# Patient Record
Sex: FEMALE | Race: WHITE | NOT HISPANIC OR LATINO | Employment: FULL TIME | URBAN - METROPOLITAN AREA
[De-identification: names, ages, dates, MRNs, and addresses within clinical notes are randomized per-mention and may not be internally consistent; named-entity substitution may affect disease eponyms.]

---

## 2017-02-01 ENCOUNTER — OFFICE VISIT (OUTPATIENT)
Dept: FAMILY MEDICINE CLINIC | Facility: CLINIC | Age: 42
End: 2017-02-01

## 2017-02-01 VITALS
HEART RATE: 86 BPM | SYSTOLIC BLOOD PRESSURE: 122 MMHG | TEMPERATURE: 98.4 F | OXYGEN SATURATION: 99 % | HEIGHT: 65 IN | RESPIRATION RATE: 16 BRPM | BODY MASS INDEX: 36.49 KG/M2 | DIASTOLIC BLOOD PRESSURE: 60 MMHG | WEIGHT: 219 LBS

## 2017-02-01 DIAGNOSIS — R51.9 GENERALIZED HEADACHE: ICD-10-CM

## 2017-02-01 DIAGNOSIS — R42 DIZZINESS: Primary | ICD-10-CM

## 2017-02-01 DIAGNOSIS — R11.0 NAUSEA: ICD-10-CM

## 2017-02-01 PROCEDURE — 99214 OFFICE O/P EST MOD 30 MIN: CPT | Performed by: PHYSICIAN ASSISTANT

## 2017-02-01 RX ORDER — FLUTICASONE PROPIONATE 50 MCG
1 SPRAY, SUSPENSION (ML) NASAL
COMMUNITY
End: 2018-12-05

## 2017-02-01 NOTE — PROGRESS NOTES
Subjective   Chen Martin is a 41 y.o. female.   Chief Complaint   Patient presents with   • Dizziness     Vertigo       History of Present Illness     Chen is a 41-year-old female who presents with dizziness/vertigo for the past several years.  Chen has lost 6 pounds since February 26, 2016. States she gets dizzy and lightheaded.  Takes OTC dramamine daily.  She gets car sick if she is a passenger.  She is able to drive but still gets slightly dizzy.  Chen states when she turns her head she gets dizzy.  She gets nauseated daily. She gets an occasional headache,vomiting,head injury/trauma or vision changes.  Appetite has been normal.  Sleep has been normal. Bowel movements are daily without dark black tarry stools.  She has an IUD for birth control.  Chen was seen ENT specialist, Dr. Francis Carrion, last year.  She had a normal CAT scan of temporal bones.      The following portions of the patient's history were reviewed and updated as appropriate: allergies, current medications, past family history, past medical history, past social history and past surgical history.    Review of Systems   Constitutional: Negative.  Negative for chills, fatigue and fever.   HENT: Negative for congestion, ear pain, postnasal drip, rhinorrhea, sinus pressure and sore throat.    Eyes: Negative.    Respiratory: Negative.  Negative for cough, shortness of breath and wheezing.    Cardiovascular: Negative.  Negative for chest pain, palpitations and leg swelling.   Gastrointestinal: Negative.  Negative for constipation, diarrhea, nausea and vomiting.   Endocrine: Negative.    Genitourinary: Negative.    Musculoskeletal: Negative.    Skin: Negative.    Allergic/Immunologic: Negative.    Neurological: Positive for dizziness, light-headedness and headaches.   Hematological: Negative.    Psychiatric/Behavioral: Negative.    All other systems reviewed and are negative.    Vitals:    02/01/17 1031 02/01/17 1035 02/01/17 1037  "02/01/17 1038   BP: 114/74 112/64 118/76 122/60   BP Location: Right arm Left arm Right arm Left arm   Patient Position: Sitting Standing Sitting Lying   Cuff Size: Adult Adult Adult Adult   Pulse: 90 99 103 86   Resp: 16      Temp: 98.4 °F (36.9 °C)      TempSrc: Oral      SpO2: 99%      Weight: 219 lb (99.3 kg)      Height: 64.5\" (163.8 cm)        Wt Readings from Last 3 Encounters:   02/01/17 219 lb (99.3 kg)   02/26/16 225 lb 4.8 oz (102 kg)       BP Readings from Last 3 Encounters:   02/01/17 122/60   02/26/16 126/80     Body mass index is 37.01 kg/(m^2).    Allergies   Allergen Reactions   • Sulfa Antibiotics    • Vicodin [Hydrocodone-Acetaminophen]          Objective   Physical Exam   Constitutional: She is oriented to person, place, and time. Vital signs are normal. She appears well-developed and well-nourished.   HENT:   Head: Normocephalic and atraumatic.   Right Ear: Hearing, tympanic membrane, external ear and ear canal normal.   Left Ear: Hearing, tympanic membrane, external ear and ear canal normal.   Nose: Nose normal. Right sinus exhibits no maxillary sinus tenderness and no frontal sinus tenderness. Left sinus exhibits no maxillary sinus tenderness and no frontal sinus tenderness.   Mouth/Throat: Uvula is midline, oropharynx is clear and moist and mucous membranes are normal.   Eyes: Conjunctivae, EOM and lids are normal. Pupils are equal, round, and reactive to light.   Fundoscopic exam:       The right eye shows no papilledema.        The left eye shows no papilledema.   Neck: Trachea normal and phonation normal. Neck supple. Carotid bruit is not present. No edema present. No thyromegaly present.   Cardiovascular: Normal rate, regular rhythm, S1 normal, S2 normal, normal heart sounds, intact distal pulses and normal pulses.    Pulmonary/Chest: Effort normal and breath sounds normal.   Abdominal: Soft. Normal appearance, normal aorta and bowel sounds are normal. There is no hepatomegaly. There is " no tenderness.   Lymphadenopathy:     She has no cervical adenopathy.   Neurological: She is alert and oriented to person, place, and time. She has normal strength and normal reflexes. No cranial nerve deficit or sensory deficit. She displays a negative Romberg sign.   Reflex Scores:       Patellar reflexes are 2+ on the right side and 2+ on the left side.  Skin: Skin is warm, dry and intact.   Psychiatric: She has a normal mood and affect. Her speech is normal and behavior is normal. Judgment and thought content normal. Cognition and memory are normal.       Assessment/Plan   Chen was seen today for dizziness.    Diagnoses and all orders for this visit:    Dizziness  -     Vitamin B12; Future  -     Folate; Future  -     MRI Brain Without Contrast; Future  -     CBC & Differential; Future  -     Comprehensive Metabolic Panel; Future  -     Thyroid Panel With TSH; Future  -     Vitamin B12  -     Folate  -     CBC & Differential  -     Comprehensive Metabolic Panel  -     Thyroid Panel With TSH    Generalized headache  -     Vitamin B12; Future  -     Folate; Future  -     MRI Brain Without Contrast; Future  -     CBC & Differential; Future  -     Comprehensive Metabolic Panel; Future  -     Thyroid Panel With TSH; Future  -     Vitamin B12  -     Folate  -     CBC & Differential  -     Comprehensive Metabolic Panel  -     Thyroid Panel With TSH    Nausea  -     Vitamin B12; Future  -     Folate; Future  -     MRI Brain Without Contrast; Future  -     CBC & Differential; Future  -     Comprehensive Metabolic Panel; Future  -     Thyroid Panel With TSH; Future  -     Vitamin B12  -     Folate  -     CBC & Differential  -     Comprehensive Metabolic Panel  -     Thyroid Panel With TSH    Ms. Chen Martin was seen in office today for chronic dizziness,headaches and nausea for the past year or so.  I have reviewed her ENT office visit notes with her at today's office visit from last year.  I will schedule MRI of  brain for further evaluation.  She will have a vitamin D, B12/folate, CBC, CMP and  thyroid profile with TSH today today's office visit for further evaluation of her symptoms.  I'll consider possible referral to neurology if warranted.  I have asked Chen to start drinking 1 Gatorade a day to see if this will help improve some of her symptoms.  She may continue using her over-the-counter Dramamine as needed.      Dragon transcription disclaimer    Much of this encounter note is an electronic transcription/translation of spoken language to printed text.  The electronic translation of spoken language may permit erroneous, or at times, nonsensical words or phrases to be inadvertently transcribed.  Although I have reviewed the note for such errors, some may still exist.    Edyta Schmid PA-C  Family Practice

## 2017-02-02 ENCOUNTER — TELEPHONE (OUTPATIENT)
Dept: FAMILY MEDICINE CLINIC | Facility: CLINIC | Age: 42
End: 2017-02-02

## 2017-02-02 LAB
ALBUMIN SERPL-MCNC: 4.6 G/DL (ref 3.5–5.2)
ALBUMIN/GLOB SERPL: 2 G/DL
ALP SERPL-CCNC: 91 U/L (ref 40–129)
ALT SERPL-CCNC: 15 U/L (ref 5–33)
AST SERPL-CCNC: 15 U/L (ref 5–32)
BASOPHILS # BLD AUTO: 0.02 10*3/MM3 (ref 0–0.2)
BASOPHILS NFR BLD AUTO: 0.3 % (ref 0–2)
BILIRUB SERPL-MCNC: 0.6 MG/DL (ref 0.2–1.2)
BUN SERPL-MCNC: 9 MG/DL (ref 6–20)
BUN/CREAT SERPL: 12.3 (ref 7–25)
CALCIUM SERPL-MCNC: 9.5 MG/DL (ref 8.6–10.5)
CHLORIDE SERPL-SCNC: 101 MMOL/L (ref 98–107)
CO2 SERPL-SCNC: 26.3 MMOL/L (ref 22–29)
CREAT SERPL-MCNC: 0.73 MG/DL (ref 0.57–1)
EOSINOPHIL # BLD AUTO: 0.14 10*3/MM3 (ref 0.1–0.3)
EOSINOPHIL NFR BLD AUTO: 2.1 % (ref 0–4)
ERYTHROCYTE [DISTWIDTH] IN BLOOD BY AUTOMATED COUNT: 11.8 % (ref 11.5–14.5)
FOLATE SERPL-MCNC: 14.69 NG/ML (ref 4.78–24.2)
FT4I SERPL CALC-MCNC: 2 (ref 1.2–4.9)
GLOBULIN SER CALC-MCNC: 2.3 GM/DL
GLUCOSE SERPL-MCNC: 100 MG/DL (ref 65–99)
HCT VFR BLD AUTO: 41.6 % (ref 37–47)
HGB BLD-MCNC: 13.6 G/DL (ref 12–16)
IMM GRANULOCYTES # BLD: 0.01 10*3/MM3 (ref 0–0.03)
IMM GRANULOCYTES NFR BLD: 0.1 % (ref 0–0.5)
LYMPHOCYTES # BLD AUTO: 2.02 10*3/MM3 (ref 0.6–4.8)
LYMPHOCYTES NFR BLD AUTO: 29.9 % (ref 20–45)
MCH RBC QN AUTO: 31.5 PG (ref 27–31)
MCHC RBC AUTO-ENTMCNC: 32.7 G/DL (ref 31–37)
MCV RBC AUTO: 96.3 FL (ref 81–99)
MONOCYTES # BLD AUTO: 0.54 10*3/MM3 (ref 0–1)
MONOCYTES NFR BLD AUTO: 8 % (ref 3–8)
NEUTROPHILS # BLD AUTO: 4.03 10*3/MM3 (ref 1.5–8.3)
NEUTROPHILS NFR BLD AUTO: 59.6 % (ref 45–70)
NRBC BLD AUTO-RTO: 0 /100 WBC (ref 0–0)
PLATELET # BLD AUTO: 301 10*3/MM3 (ref 140–500)
POTASSIUM SERPL-SCNC: 4.9 MMOL/L (ref 3.5–5.2)
PROT SERPL-MCNC: 6.9 G/DL (ref 6–8.5)
RBC # BLD AUTO: 4.32 10*6/MM3 (ref 4.2–5.4)
SODIUM SERPL-SCNC: 142 MMOL/L (ref 136–145)
T3RU NFR SERPL: 28 % (ref 24–39)
T4 SERPL-MCNC: 7 UG/DL (ref 4.5–12)
TSH SERPL DL<=0.005 MIU/L-ACNC: 1.28 UIU/ML (ref 0.45–4.5)
VIT B12 SERPL-MCNC: 365 PG/ML (ref 211–946)
WBC # BLD AUTO: 6.76 10*3/MM3 (ref 4.8–10.8)

## 2017-02-02 NOTE — TELEPHONE ENCOUNTER
----- Message from Edyta Schmid PA-C sent at 2/2/2017  7:56 AM EST -----  1.  Please notify patient that her vitamin B-12 and folate were normal.  2.  CBC was also normal.  3.  Sugar was normal at 100.  She was non fasting.  4.  Thyroid testing was normal.

## 2017-03-10 ENCOUNTER — HOSPITAL ENCOUNTER (OUTPATIENT)
Dept: MRI IMAGING | Facility: HOSPITAL | Age: 42
Discharge: HOME | End: 2017-03-10

## 2017-03-10 ENCOUNTER — HOSPITAL ENCOUNTER (OUTPATIENT)
Dept: MRI IMAGING | Facility: HOSPITAL | Age: 42
End: 2017-03-10

## 2017-03-10 ENCOUNTER — HOSPITAL ENCOUNTER (OUTPATIENT)
Dept: MRI IMAGING | Facility: HOSPITAL | Age: 42
Discharge: HOME | End: 2017-03-10
Admitting: PHYSICIAN ASSISTANT

## 2017-03-10 DIAGNOSIS — R42 DIZZINESS: ICD-10-CM

## 2017-03-10 DIAGNOSIS — R11.0 NAUSEA: ICD-10-CM

## 2017-03-10 DIAGNOSIS — R51.9 GENERALIZED HEADACHE: ICD-10-CM

## 2017-03-10 PROCEDURE — 70551 MRI BRAIN STEM W/O DYE: CPT

## 2017-03-16 DIAGNOSIS — H69.82 DYSFUNCTION OF LEFT EUSTACHIAN TUBE: ICD-10-CM

## 2017-03-16 DIAGNOSIS — R51.9 GENERALIZED HEADACHE: ICD-10-CM

## 2017-03-16 DIAGNOSIS — R11.2 NON-INTRACTABLE VOMITING WITH NAUSEA, UNSPECIFIED VOMITING TYPE: ICD-10-CM

## 2017-03-16 DIAGNOSIS — R42 DIZZINESS: Primary | ICD-10-CM

## 2017-03-30 ENCOUNTER — HOSPITAL ENCOUNTER (OUTPATIENT)
Dept: MRI IMAGING | Facility: HOSPITAL | Age: 42
End: 2017-03-30

## 2017-04-11 ENCOUNTER — HOSPITAL ENCOUNTER (OUTPATIENT)
Dept: MRI IMAGING | Facility: HOSPITAL | Age: 42
Discharge: HOME | End: 2017-04-11
Admitting: PHYSICIAN ASSISTANT

## 2017-04-11 DIAGNOSIS — R11.2 NON-INTRACTABLE VOMITING WITH NAUSEA, UNSPECIFIED VOMITING TYPE: ICD-10-CM

## 2017-04-11 DIAGNOSIS — H69.82 DYSFUNCTION OF LEFT EUSTACHIAN TUBE: ICD-10-CM

## 2017-04-11 DIAGNOSIS — R42 DIZZINESS: ICD-10-CM

## 2017-04-11 DIAGNOSIS — R51.9 GENERALIZED HEADACHE: ICD-10-CM

## 2017-04-11 PROCEDURE — 0 GADOBENATE DIMEGLUMINE 529 MG/ML SOLUTION: Performed by: PHYSICIAN ASSISTANT

## 2017-04-11 PROCEDURE — 70553 MRI BRAIN STEM W/O & W/DYE: CPT

## 2017-04-11 PROCEDURE — A9577 INJ MULTIHANCE: HCPCS | Performed by: PHYSICIAN ASSISTANT

## 2017-04-11 RX ADMIN — GADOBENATE DIMEGLUMINE 19 ML: 529 INJECTION, SOLUTION INTRAVENOUS at 17:15

## 2017-04-14 DIAGNOSIS — R51.9 GENERALIZED HEADACHE: Primary | ICD-10-CM

## 2017-04-14 DIAGNOSIS — R42 DIZZINESS: ICD-10-CM

## 2017-06-12 ENCOUNTER — OFFICE VISIT (OUTPATIENT)
Dept: NEUROLOGY | Facility: CLINIC | Age: 42
End: 2017-06-12

## 2017-06-12 VITALS
SYSTOLIC BLOOD PRESSURE: 139 MMHG | BODY MASS INDEX: 37.32 KG/M2 | HEIGHT: 65 IN | DIASTOLIC BLOOD PRESSURE: 82 MMHG | WEIGHT: 224 LBS

## 2017-06-12 DIAGNOSIS — R42 DIZZINESS: Primary | ICD-10-CM

## 2017-06-12 DIAGNOSIS — R51.9 GENERALIZED HEADACHE: ICD-10-CM

## 2017-06-12 PROCEDURE — 99243 OFF/OP CNSLTJ NEW/EST LOW 30: CPT | Performed by: PSYCHIATRY & NEUROLOGY

## 2017-06-12 NOTE — PROGRESS NOTES
Subjective:     Patient ID: Chen Martin is a 41 y.o. female.    History of Present Illness   The patient is a 41-year-old right-handed woman who was seen for further evaluation of feeling dizzy and car sickness.  The patient was seen today in consultation per the request of Edyta Schmid.  Whenever she drives or turns her head quickly she will get vertigo or feeling like floating she's been seen by ENT but not prescribed any medicine.  She had an MRI the brain which showed some white matter changes only that were nonspecific she's also had an MRI of the auditory canals was normal.  She uses Dramamine when she drives.  She also gets headaches that occur only after she is dizzy.  She does not have headaches independent of the dizziness.  The following portions of the patient's history were reviewed and updated as appropriate: allergies, current medications, past family history, past medical history, past social history, past surgical history and problem list.    Family History   Problem Relation Age of Onset   • Osteoporosis Mother    • Diabetes Brother    • Heart disease Maternal Grandmother    • Hypertension Maternal Grandmother    • Glaucoma Maternal Grandmother      Active Ambulatory Problems     Diagnosis Date Noted   • Encounter for annual physical exam 02/26/2016   • Allergic rhinitis 02/26/2016   • Left knee pain 02/26/2016   • Dysfunction of left eustachian tube 02/26/2016   • Dizziness 02/01/2017   • Generalized headache 02/01/2017   • Nausea 02/01/2017     Resolved Ambulatory Problems     Diagnosis Date Noted   • No Resolved Ambulatory Problems     Past Medical History:   Diagnosis Date   • Migraine      Social History     Social History   • Marital status:      Spouse name: N/A   • Number of children: N/A   • Years of education: N/A     Occupational History   • Not on file.     Social History Main Topics   • Smoking status: Never Smoker   • Smokeless tobacco: Not on file   • Alcohol use Yes       Comment: SOCIALLY   • Drug use: No   • Sexual activity: Not on file     Other Topics Concern   • Not on file     Social History Narrative       Review of Systems   Constitutional: Negative.    Eyes: Negative.    Respiratory: Negative.    Cardiovascular: Negative.    Gastrointestinal: Negative.    Endocrine: Negative.    Musculoskeletal: Negative.    Skin: Negative.    Allergic/Immunologic: Negative.    Neurological: Positive for dizziness, light-headedness and headaches. Negative for tremors, seizures, syncope, facial asymmetry, speech difficulty, weakness and numbness.   Hematological: Negative.    Psychiatric/Behavioral: Negative.         Objective:    Neurologic Exam  Mental status examination showed normal orientation, memory, and speech.  Attention span and concentration were normal.  Fund of knowledge was normal.  Funduscopic showed no abnormality.  Visual fields were full.  Pupillary reflexes were 5 mm, symmetric, and equally reactive to light.  Eye movements were full and conjugate.  Gag reflex was normal.  Hearing was normal.  Muscles of mastication were normal.  No facial weakness was noted.  Shoulder shrug strength was normal bilaterally.  Tongue protrudes midline.  There is no drift of outstretched arms.  Deep tendon reflexes are 2+ and symmetric.  No focal weakness or atrophy was noted.  Tone was normal in all extremities.  No cerebellar signs were noted.  No abnormal movements were noted.  The patient's gait was normal.  No other pathologic reflexes such as Babinski's sign were noted.  No sensory abnormalities were noted.  Physical Exam    Assessment/Plan:     Chen was seen today for headache and dizziness.    Diagnoses and all orders for this visit:    Dizziness    Generalized headache       I do not feel that her problems are primarily neurological.  Her dizziness and car sickness are almost certainly related to peripheral vestibular dysfunction.  She has had neurological workup already.  She has  not been tried on any medication to suppress her presumed in her ear symptoms.    She also has headaches but they are only secondary to the attacks of dizziness.  This is not a primary headache problem.    As this is not a primary neurological problem I have not entered into her treatment medically.  However I feel that medications taken on a regular basis may help  her symptoms.  These include Antivert or a benzodiazepine.  The benzodiazepine that may help is Klonopin and at starting dose of 0.5 mg twice daily.    Plan to see her back in the office as needed.Thank you for allowing me to share in the care of this patient.  Adolph Minaya M.D.

## 2017-07-11 ENCOUNTER — APPOINTMENT (OUTPATIENT)
Dept: GENERAL RADIOLOGY | Facility: HOSPITAL | Age: 42
End: 2017-07-11

## 2017-07-11 PROCEDURE — 71020 XR CHEST 2 VW: CPT | Performed by: FAMILY MEDICINE

## 2017-07-11 PROCEDURE — 71020 HC CHEST PA AND LATERAL: CPT | Performed by: FAMILY MEDICINE

## 2018-03-30 ENCOUNTER — OFFICE VISIT (OUTPATIENT)
Dept: ORTHOPEDIC SURGERY | Facility: CLINIC | Age: 43
End: 2018-03-30

## 2018-03-30 VITALS — WEIGHT: 230 LBS | BODY MASS INDEX: 40.75 KG/M2 | HEIGHT: 63 IN

## 2018-03-30 DIAGNOSIS — S52.024A: ICD-10-CM

## 2018-03-30 DIAGNOSIS — R52 PAIN: Primary | ICD-10-CM

## 2018-03-30 PROCEDURE — 73080 X-RAY EXAM OF ELBOW: CPT | Performed by: NURSE PRACTITIONER

## 2018-03-30 PROCEDURE — 73110 X-RAY EXAM OF WRIST: CPT | Performed by: NURSE PRACTITIONER

## 2018-03-30 PROCEDURE — 99203 OFFICE O/P NEW LOW 30 MIN: CPT | Performed by: NURSE PRACTITIONER

## 2018-03-30 RX ORDER — ONDANSETRON 4 MG/1
4 TABLET, FILM COATED ORAL EVERY 8 HOURS PRN
Qty: 30 TABLET | Refills: 0 | Status: SHIPPED | OUTPATIENT
Start: 2018-03-30 | End: 2018-12-05

## 2018-03-30 RX ORDER — IBUPROFEN 600 MG/1
600 TABLET ORAL 3 TIMES DAILY
Qty: 90 TABLET | Refills: 0 | Status: SHIPPED | OUTPATIENT
Start: 2018-03-30 | End: 2018-12-04 | Stop reason: SDUPTHER

## 2018-03-30 RX ORDER — HYDROCODONE BITARTRATE AND ACETAMINOPHEN 5; 325 MG/1; MG/1
TABLET ORAL
Qty: 30 TABLET | Refills: 0 | Status: SHIPPED | OUTPATIENT
Start: 2018-03-30 | End: 2018-12-05

## 2018-03-30 NOTE — PROGRESS NOTES
"Subjective:     Patient ID: Chen Martin is a 42 y.o. female.    Chief Complaint: right elbow injury, 2018    History of Present Illness    Chen Martin presents with a 24 hour onset of pain at right elbow after falling while walking out of her home. Pain present at posterior aspect right elbow, throbbing in nature, increased pain noted with extension and flexion. Reports tingling sensation radiating down to hand and swelling present over elbow. Denies known previous injury in past. Has been taking ibuprofen as needed for symptom relief and applying ice. Denies all other concerns present at this time.      Social History     Occupational History   • Not on file.     Social History Main Topics   • Smoking status: Never Smoker   • Smokeless tobacco: Never Used   • Alcohol use Yes      Comment: SOCIALLY   • Drug use: No   • Sexual activity: Defer      Past Medical History:   Diagnosis Date   • Migraine      Past Surgical History:   Procedure Laterality Date   •  SECTION     • SINUS SURGERY         Family History   Problem Relation Age of Onset   • Osteoporosis Mother    • Diabetes Brother    • Heart disease Maternal Grandmother    • Hypertension Maternal Grandmother    • Glaucoma Maternal Grandmother          Review of Systems   Musculoskeletal: Positive for myalgias.           Objective:  Physical Exam    Vital signs reviewed.   General: No acute distress.  Eyes: conjunctiva clear; pupils equally round and reactive  ENT: external ears and nose atraumatic; oropharynx clear  CV: no peripheral edema  Resp: normal respiratory effort  Skin: no rashes or wounds; normal turgor  Psych: mood and affect appropriate; recent and remote memory intact    Vitals:    18 0836   Weight: 104 kg (230 lb)   Height: 160 cm (63\")     1    18  0836   Weight: 104 kg (230 lb)     Body mass index is 40.74 kg/m².     Right Elbow Exam     Tenderness   The patient is experiencing tenderness in the olecranon fossa. "     Range of Motion   Extension: 10   Flexion: 100   Pronation: 10   Supination: 100     Muscle Strength   Pronation:  4/5   Supination:  4/5     Tests Varus: negative  Valgus: negative  Tinel's Sign (cubital tunnel): negative    Other   Erythema: absent  Scars: absent  Sensation: normal  Pulse: present              Imaging:  Right Elbow X-Ray  Indication: Pain  Views: AP and Lateral views    Findings:  Nondisplaced fracture olecranon process  No bony lesion  Normal soft tissues  Normal joint spaces  No prior studies were available for comparison.    Right Wrist X-Ray  Indication: Pain  AP, Lateral, and Oblique views    Findings:  No fracture  No bony lesion  Normal soft tissues  Normal joint spaces    No prior studies were available for comparison.    Assessment:       1. Pain    2. Closed nondisplaced fracture of olecranon process of right ulna without intra-articular extension, initial encounter          Plan:  1. Discussed plan of care with patient. Provided with sling and will see her back in two weeks, x-rays to be completed at that time right elbow. Patient verbalized understanding of all information and agrees with plan of care. Denies all other concerns present at this time.     Orders:  Orders Placed This Encounter   Procedures   • XR elbow 3+ vw right   • XR Wrist 3+ View Right       I ordered and reviewed the EARLE today.     Dictated utilizing Dragon dictation

## 2018-04-13 ENCOUNTER — OFFICE VISIT (OUTPATIENT)
Dept: ORTHOPEDIC SURGERY | Facility: CLINIC | Age: 43
End: 2018-04-13

## 2018-04-13 VITALS — WEIGHT: 215 LBS | BODY MASS INDEX: 36.7 KG/M2 | HEIGHT: 64 IN

## 2018-04-13 DIAGNOSIS — S52.024D CLOSED NONDISPLACED FRACTURE OF OLECRANON PROCESS OF RIGHT ULNA WITHOUT INTRA-ARTICULAR EXTENSION WITH ROUTINE HEALING, SUBSEQUENT ENCOUNTER: ICD-10-CM

## 2018-04-13 DIAGNOSIS — R52 PAIN: Primary | ICD-10-CM

## 2018-04-13 PROCEDURE — 73080 X-RAY EXAM OF ELBOW: CPT | Performed by: NURSE PRACTITIONER

## 2018-04-13 PROCEDURE — 99024 POSTOP FOLLOW-UP VISIT: CPT | Performed by: NURSE PRACTITIONER

## 2018-04-13 NOTE — PROGRESS NOTES
CC: follow-up nondisplaced fracture right ulna, 03/29/2018    Interval history: Maryanne presents back to clinic today for follow-up right elbow. Continues to experience pain with extension and flexion activities. Denies presence of numbness and tingling radiating down right upper extremity. Denies all other concerns present at this time.     Exam:  ROM: -10 degrees - 90 degrees  Tenderness over lateral epicondyle   Positive sensation all aspects right upper extremity    Imaging:   Right Elbow X-Ray  Indication: Pain  Views: AP and Lateral views    Findings:  Nondisplaced fracture olecranon process right ulna  No bony lesion  Normal soft tissues  Normal joint spaces  Prior studies were available for comparison.    Assessment:   1. Closed, nondisplaced fracture olecranon process of right ulna, subsequent encounter    Plan:  1. Will plan to see her back in three weeks, repeat x-rays right elbow at that time. Patient verbalized understanding of all information and agrees with plan of care. Denies all other concerns present at this time.

## 2018-05-02 NOTE — TELEPHONE ENCOUNTER
rx was sent in this morning.      ----- Message from OBIE Gomez sent at 5/1/2018  4:22 PM EDT -----  Meme Nunez,  Can you please send in a prescription for Pennsaid for this patient? Thank you!! All is well and hope to go home tomorrow :)

## 2018-12-04 ENCOUNTER — OFFICE VISIT (OUTPATIENT)
Dept: ORTHOPEDIC SURGERY | Facility: CLINIC | Age: 43
End: 2018-12-04

## 2018-12-04 VITALS — WEIGHT: 200 LBS | HEIGHT: 64 IN | BODY MASS INDEX: 34.15 KG/M2

## 2018-12-04 DIAGNOSIS — R52 PAIN: Primary | ICD-10-CM

## 2018-12-04 DIAGNOSIS — M77.11 LATERAL EPICONDYLITIS OF RIGHT ELBOW: ICD-10-CM

## 2018-12-04 PROCEDURE — 99213 OFFICE O/P EST LOW 20 MIN: CPT | Performed by: NURSE PRACTITIONER

## 2018-12-04 PROCEDURE — 73080 X-RAY EXAM OF ELBOW: CPT | Performed by: NURSE PRACTITIONER

## 2018-12-04 PROCEDURE — 20605 DRAIN/INJ JOINT/BURSA W/O US: CPT | Performed by: NURSE PRACTITIONER

## 2018-12-04 RX ORDER — IBUPROFEN 600 MG/1
600 TABLET ORAL EVERY 8 HOURS PRN
Qty: 90 TABLET | Refills: 3 | Status: SHIPPED | OUTPATIENT
Start: 2018-12-04 | End: 2019-12-23

## 2018-12-04 RX ADMIN — LIDOCAINE HYDROCHLORIDE 2 ML: 10 INJECTION, SOLUTION EPIDURAL; INFILTRATION; INTRACAUDAL; PERINEURAL at 15:55

## 2018-12-04 RX ADMIN — BETAMETHASONE SODIUM PHOSPHATE AND BETAMETHASONE ACETATE 6 MG: 3; 3 INJECTION, SUSPENSION INTRA-ARTICULAR; INTRALESIONAL; INTRAMUSCULAR; SOFT TISSUE at 15:55

## 2018-12-04 NOTE — PROGRESS NOTES
Subjective:     Patient ID: Chen Martin is a 43 y.o. female.    Chief Complaint:  Right elbow pain  History of Present Illness  Chen Martin presents to clinic with a four-day worsening history of pain at the lateral aspect of the elbow.  Increased pain noted with repetitive motions and with lifting activities.  Most recently she was attempting to water [place] tree would've felt as if the elbow up her extended and began experiencing more significant pain.  She has had a fracture of the olecranon process approximately 7-8 months ago and states she has felt weaker since the injury.  Denies that she has been experiencing significant pain up until the last 4 days.  She has not tried bracing and has been taking 1-2 tablets of ibuprofen as needed.  She is right-hand dominant and therefore completing the majority of the activities with her right upper extremity.  Denies presence of numbness or tingling radiating down the right upper extremity.  Denies previous MRI or CT of the right elbow.  Denies all other concerns present this time.       Social History     Occupational History   • Not on file   Tobacco Use   • Smoking status: Never Smoker   • Smokeless tobacco: Never Used   Substance and Sexual Activity   • Alcohol use: Yes     Comment: SOCIALLY   • Drug use: No   • Sexual activity: Defer      Past Medical History:   Diagnosis Date   • Migraine      Past Surgical History:   Procedure Laterality Date   •  SECTION     • SINUS SURGERY         Family History   Problem Relation Age of Onset   • Osteoporosis Mother    • Diabetes Brother    • Heart disease Maternal Grandmother    • Hypertension Maternal Grandmother    • Glaucoma Maternal Grandmother          Review of Systems   Constitutional: Negative for chills, diaphoresis, fever and unexpected weight change.   HENT: Negative for hearing loss, nosebleeds, sore throat and tinnitus.    Eyes: Negative for pain and visual disturbance.   Respiratory: Negative  "for cough, shortness of breath and wheezing.    Cardiovascular: Negative for chest pain and palpitations.   Gastrointestinal: Negative for abdominal pain, diarrhea, nausea and vomiting.   Endocrine: Negative for cold intolerance, heat intolerance and polydipsia.   Genitourinary: Negative for difficulty urinating, dysuria and hematuria.   Musculoskeletal: Positive for arthralgias and myalgias. Negative for joint swelling.   Skin: Negative for rash and wound.   Allergic/Immunologic: Negative for environmental allergies.   Neurological: Negative for dizziness, syncope and numbness.   Hematological: Does not bruise/bleed easily.   Psychiatric/Behavioral: Negative for dysphoric mood and sleep disturbance. The patient is not nervous/anxious.            Objective:  Physical Exam    General: No acute distress.  Eyes: conjunctiva clear; pupils equally round and reactive  ENT: external ears and nose atraumatic; oropharynx clear  CV: no peripheral edema  Resp: normal respiratory effort  Skin: no rashes or wounds; normal turgor  Psych: mood and affect appropriate; recent and remote memory intact    Vitals:    12/04/18 1533   Weight: 90.7 kg (200 lb)   Height: 162.6 cm (64\")         12/04/18  1533   Weight: 90.7 kg (200 lb)     Body mass index is 34.33 kg/m².      Right Elbow Exam     Tenderness   The patient is experiencing tenderness in the lateral epicondyle.     Range of Motion   Extension: 0   Flexion: 110   Pronation: 0   Supination: 90     Muscle Strength   Pronation:  5/5   Supination:  5/5     Tests   Varus: negative  Valgus: negative  Tinel's sign (cubital tunnel): negative    Other   Erythema: absent  Scars: absent  Sensation: normal  Pulse: present               Imaging:  Right Elbow X-Ray  Indication: Pain  Views: AP and Lateral views    Findings:  No fracture  No bony lesion  Normal soft tissues  Normal joint spaces  Prior studies were available for comparison.    Assessment:        1. Pain    2. Lateral " epicondylitis of right elbow           Plan:  1.  Discussed plan of care with patient.  Wishes to proceed with corticosteroid injection over the lateral epicondyles the right upper extremity.  2.  We'll refill the ibuprofen 600 mg 1 tablet 3 times a day as needed for mild-to-moderate pain.  3.  Discussed with patient to apply topical moist heat as needed and complete strengthening exercises of the right upper extremity while in shower.  If not improving we'll plan to see her back in clinic.  Patient verbalized understanding of all information agrees plan of care.  Denies other concerns present this time.  Medium Joint Arthrocentesis: R elbow  Date/Time: 12/4/2018 3:55 PM  Consent given by: patient  Site marked: site marked  Timeout: Immediately prior to procedure a time out was called to verify the correct patient, procedure, equipment, support staff and site/side marked as required   Supporting Documentation  Indications: pain   Procedure Details  Location: elbow - R elbow  Preparation: Patient was prepped and draped in the usual sterile fashion  Needle size: 22 G  Approach: lateral.  Medications administered: 2 mL lidocaine PF 1% 1 %; 6 mg betamethasone acetate-betamethasone sodium phosphate 6 (3-3) MG/ML  Patient tolerance: patient tolerated the procedure well with no immediate complications          Orders:  Orders Placed This Encounter   Procedures   • Medium Joint Arthrocentesis: R elbow   • XR Elbow 3+ View Right       Dictated utilizing Dragon dictation

## 2018-12-05 PROBLEM — M77.11 LATERAL EPICONDYLITIS OF RIGHT ELBOW: Status: ACTIVE | Noted: 2018-12-05

## 2018-12-05 RX ORDER — BETAMETHASONE SODIUM PHOSPHATE AND BETAMETHASONE ACETATE 3; 3 MG/ML; MG/ML
6 INJECTION, SUSPENSION INTRA-ARTICULAR; INTRALESIONAL; INTRAMUSCULAR; SOFT TISSUE
Status: COMPLETED | OUTPATIENT
Start: 2018-12-04 | End: 2018-12-04

## 2018-12-05 RX ORDER — LIDOCAINE HYDROCHLORIDE 10 MG/ML
2 INJECTION, SOLUTION EPIDURAL; INFILTRATION; INTRACAUDAL; PERINEURAL
Status: COMPLETED | OUTPATIENT
Start: 2018-12-04 | End: 2018-12-04

## 2019-03-01 ENCOUNTER — OFFICE VISIT (OUTPATIENT)
Dept: ORTHOPEDIC SURGERY | Facility: CLINIC | Age: 44
End: 2019-03-01

## 2019-03-01 VITALS — WEIGHT: 200 LBS | BODY MASS INDEX: 33.32 KG/M2 | HEIGHT: 65 IN

## 2019-03-01 DIAGNOSIS — M77.11 LATERAL EPICONDYLITIS OF RIGHT ELBOW: ICD-10-CM

## 2019-03-01 DIAGNOSIS — R52 PAIN: Primary | ICD-10-CM

## 2019-03-01 PROCEDURE — 99212 OFFICE O/P EST SF 10 MIN: CPT | Performed by: NURSE PRACTITIONER

## 2019-03-01 PROCEDURE — 20605 DRAIN/INJ JOINT/BURSA W/O US: CPT | Performed by: NURSE PRACTITIONER

## 2019-03-01 RX ADMIN — LIDOCAINE HYDROCHLORIDE 2 ML: 20 INJECTION, SOLUTION EPIDURAL; INFILTRATION; INTRACAUDAL; PERINEURAL at 15:03

## 2019-03-01 RX ADMIN — TRIAMCINOLONE ACETONIDE 40 MG: 40 INJECTION, SUSPENSION INTRA-ARTICULAR; INTRAMUSCULAR at 15:03

## 2019-03-01 NOTE — PROGRESS NOTES
Subjective:     Patient ID: Chen Martin is a 43 y.o. female.    Chief Complaint:  Follow-up lateral epicondylitis, right   History of Present Illness  Chen Martin presents back to clinic for follow-up right elbow pain, lateral.  Increased pain with activities of daily living such as dressing, gripping with the right upper extremity and twisting motions.  She did receive 100% symptom relief with previous corticosteroid injection on 2018.  She has continued with application of topical anti-inflammatory and tennis elbow bracing however symptoms have returned over the last 2 weeks.  Maximal tenderness over the lateral aspect of the right elbow.  Discomfort rated at a 5-6 out of a 10 aching in nature.  Denies nonspecific injury within the last several weeks.  Denies presence of any bruising, numbness, tingling in all other concerns that she has at this time.       Social History     Occupational History   • Not on file   Tobacco Use   • Smoking status: Never Smoker   • Smokeless tobacco: Never Used   Substance and Sexual Activity   • Alcohol use: Yes     Comment: SOCIALLY   • Drug use: No   • Sexual activity: Defer      Past Medical History:   Diagnosis Date   • Migraine      Past Surgical History:   Procedure Laterality Date   •  SECTION     • SINUS SURGERY         Family History   Problem Relation Age of Onset   • Osteoporosis Mother    • Diabetes Brother    • Heart disease Maternal Grandmother    • Hypertension Maternal Grandmother    • Glaucoma Maternal Grandmother          Review of Systems   Constitutional: Negative for chills, diaphoresis, fever and unexpected weight change.   HENT: Negative for hearing loss, nosebleeds, sore throat and tinnitus.    Eyes: Negative for pain and visual disturbance.   Respiratory: Negative for cough, shortness of breath and wheezing.    Cardiovascular: Negative for chest pain and palpitations.   Gastrointestinal: Negative for abdominal pain, diarrhea, nausea and  "vomiting.   Endocrine: Negative for cold intolerance, heat intolerance and polydipsia.   Genitourinary: Negative for difficulty urinating, dysuria and hematuria.   Musculoskeletal: Positive for myalgias. Negative for arthralgias and joint swelling.   Skin: Negative for rash and wound.   Allergic/Immunologic: Negative for environmental allergies.   Neurological: Negative for dizziness, syncope and numbness.   Hematological: Does not bruise/bleed easily.   Psychiatric/Behavioral: Negative for dysphoric mood and sleep disturbance. The patient is not nervous/anxious.            Objective:  Physical Exam    General: No acute distress.  Eyes: conjunctiva clear; pupils equally round and reactive  ENT: external ears and nose atraumatic; oropharynx clear  CV: no peripheral edema  Resp: normal respiratory effort  Skin: no rashes or wounds; normal turgor  Psych: mood and affect appropriate; recent and remote memory intact    Vitals:    03/01/19 1454   Weight: 90.7 kg (200 lb)   Height: 165.1 cm (65\")         03/01/19  1454   Weight: 90.7 kg (200 lb)     Body mass index is 33.28 kg/m².      Right Elbow Exam     Tenderness   The patient is experiencing tenderness in the lateral epicondyle.     Range of Motion   Extension: 0   Flexion: 100   Pronation: 0   Supination: 90     Muscle Strength   Pronation:  4/5   Supination:  4/5     Tests   Varus: negative  Valgus: negative  Tinel's sign (cubital tunnel): negative    Other   Erythema: absent  Sensation: normal  Pulse: present    Comments:  Positive pain with resisted wrist extension   Positive pain with resisted extension of third digit  4+/5  strength            Assessment:        1. Pain    2. Lateral epicondylitis of right elbow           Plan: 1.  Discussed plan of care with patient.  Wishes to proceed with corticosteroid injection over the lateral epicondyles of the right elbow.  Encouraged her to continue with application of  Topical anti-inflammatory as well as oral " anti-inflammatory.  We did briefly discussed occupational therapy as an option as well as platelet rich plasma injection.  She is not ready to proceed with the platelet rich plasma and treatment at this time.  Patient verbalized understanding of all of my sugars with plan of care.  Denies all other concerns present at this time.    Medium Joint Arthrocentesis: R elbow  Date/Time: 3/1/2019 3:03 PM  Consent given by: patient  Site marked: site marked  Timeout: Immediately prior to procedure a time out was called to verify the correct patient, procedure, equipment, support staff and site/side marked as required   Supporting Documentation  Indications: pain   Procedure Details  Location: elbow - R elbow  Preparation: Patient was prepped and draped in the usual sterile fashion  Needle size: 22 G  Approach: anteromedial  Medications administered: 2 mL lidocaine PF 2% 2 %; 40 mg triamcinolone acetonide 40 MG/ML  Patient tolerance: patient tolerated the procedure well with no immediate complications            Orders:  Orders Placed This Encounter   Procedures   • Medium Joint Arthrocentesis: R elbow       I ordered and reviewed the EARLE today.     Dictated utilizing Dragon dictation

## 2019-03-03 RX ORDER — LIDOCAINE HYDROCHLORIDE 20 MG/ML
2 INJECTION, SOLUTION EPIDURAL; INFILTRATION; INTRACAUDAL; PERINEURAL
Status: COMPLETED | OUTPATIENT
Start: 2019-03-01 | End: 2019-03-01

## 2019-03-03 RX ORDER — TRIAMCINOLONE ACETONIDE 40 MG/ML
40 INJECTION, SUSPENSION INTRA-ARTICULAR; INTRAMUSCULAR
Status: COMPLETED | OUTPATIENT
Start: 2019-03-01 | End: 2019-03-01

## 2019-06-06 DIAGNOSIS — Z00.00 ENCOUNTER FOR ANNUAL PHYSICAL EXAM: Primary | ICD-10-CM

## 2019-06-08 LAB
ALBUMIN SERPL-MCNC: 4.4 G/DL (ref 3.5–5.2)
ALBUMIN/GLOB SERPL: 1.8 G/DL
ALP SERPL-CCNC: 89 U/L (ref 39–117)
ALT SERPL-CCNC: 21 U/L (ref 1–33)
AST SERPL-CCNC: 14 U/L (ref 1–32)
BASOPHILS # BLD AUTO: 0.03 10*3/MM3 (ref 0–0.2)
BASOPHILS NFR BLD AUTO: 0.5 % (ref 0–1.5)
BILIRUB SERPL-MCNC: 0.5 MG/DL (ref 0.2–1.2)
BUN SERPL-MCNC: 8 MG/DL (ref 6–20)
BUN/CREAT SERPL: 12.3 (ref 7–25)
CALCIUM SERPL-MCNC: 9.7 MG/DL (ref 8.6–10.5)
CHLORIDE SERPL-SCNC: 103 MMOL/L (ref 98–107)
CO2 SERPL-SCNC: 27.3 MMOL/L (ref 22–29)
CREAT SERPL-MCNC: 0.65 MG/DL (ref 0.57–1)
EOSINOPHIL # BLD AUTO: 0.2 10*3/MM3 (ref 0–0.4)
EOSINOPHIL NFR BLD AUTO: 3.1 % (ref 0.3–6.2)
ERYTHROCYTE [DISTWIDTH] IN BLOOD BY AUTOMATED COUNT: 12.5 % (ref 12.3–15.4)
FOLATE SERPL-MCNC: 4.91 NG/ML (ref 4.78–24.2)
GLOBULIN SER CALC-MCNC: 2.4 GM/DL
GLUCOSE SERPL-MCNC: 90 MG/DL (ref 65–99)
HCT VFR BLD AUTO: 43.7 % (ref 34–46.6)
HGB BLD-MCNC: 13.6 G/DL (ref 12–15.9)
IMM GRANULOCYTES # BLD AUTO: 0.02 10*3/MM3 (ref 0–0.05)
IMM GRANULOCYTES NFR BLD AUTO: 0.3 % (ref 0–0.5)
LYMPHOCYTES # BLD AUTO: 1.68 10*3/MM3 (ref 0.7–3.1)
LYMPHOCYTES NFR BLD AUTO: 25.6 % (ref 19.6–45.3)
MCH RBC QN AUTO: 31.8 PG (ref 26.6–33)
MCHC RBC AUTO-ENTMCNC: 31.1 G/DL (ref 31.5–35.7)
MCV RBC AUTO: 102.1 FL (ref 79–97)
MONOCYTES # BLD AUTO: 0.62 10*3/MM3 (ref 0.1–0.9)
MONOCYTES NFR BLD AUTO: 9.5 % (ref 5–12)
NEUTROPHILS # BLD AUTO: 4 10*3/MM3 (ref 1.7–7)
NEUTROPHILS NFR BLD AUTO: 61 % (ref 42.7–76)
NRBC BLD AUTO-RTO: 0 /100 WBC (ref 0–0.2)
PLATELET # BLD AUTO: 267 10*3/MM3 (ref 140–450)
POTASSIUM SERPL-SCNC: 4.5 MMOL/L (ref 3.5–5.2)
PROT SERPL-MCNC: 6.8 G/DL (ref 6–8.5)
RBC # BLD AUTO: 4.28 10*6/MM3 (ref 3.77–5.28)
SODIUM SERPL-SCNC: 140 MMOL/L (ref 136–145)
TSH SERPL DL<=0.005 MIU/L-ACNC: 1.17 MIU/ML (ref 0.27–4.2)
VIT B12 SERPL-MCNC: 344 PG/ML (ref 211–946)
WBC # BLD AUTO: 6.55 10*3/MM3 (ref 3.4–10.8)

## 2019-06-10 LAB
CHOLEST SERPL-MCNC: 192 MG/DL (ref 0–200)
HDLC SERPL-MCNC: 47 MG/DL (ref 40–60)
LDLC SERPL CALC-MCNC: 121 MG/DL (ref 0–100)
TRIGL SERPL-MCNC: 122 MG/DL (ref 0–150)
VLDLC SERPL CALC-MCNC: 24.4 MG/DL (ref 5–40)
WRITTEN AUTHORIZATION: NORMAL

## 2019-06-14 ENCOUNTER — OFFICE VISIT (OUTPATIENT)
Dept: FAMILY MEDICINE CLINIC | Facility: CLINIC | Age: 44
End: 2019-06-14

## 2019-06-14 VITALS
WEIGHT: 240 LBS | BODY MASS INDEX: 39.99 KG/M2 | DIASTOLIC BLOOD PRESSURE: 78 MMHG | HEIGHT: 65 IN | SYSTOLIC BLOOD PRESSURE: 112 MMHG | TEMPERATURE: 98.2 F | OXYGEN SATURATION: 98 % | HEART RATE: 100 BPM | RESPIRATION RATE: 16 BRPM

## 2019-06-14 DIAGNOSIS — Z23 NEED FOR TETANUS, DIPHTHERIA, AND ACELLULAR PERTUSSIS (TDAP) VACCINE IN PATIENT OF ADOLESCENT AGE OR OLDER: ICD-10-CM

## 2019-06-14 DIAGNOSIS — E66.9 OBESITY (BMI 30-39.9): ICD-10-CM

## 2019-06-14 DIAGNOSIS — Z00.00 ENCOUNTER FOR ANNUAL PHYSICAL EXAM: Primary | ICD-10-CM

## 2019-06-14 PROBLEM — R11.0 NAUSEA: Status: RESOLVED | Noted: 2017-02-01 | Resolved: 2019-06-14

## 2019-06-14 PROCEDURE — 90471 IMMUNIZATION ADMIN: CPT | Performed by: PHYSICIAN ASSISTANT

## 2019-06-14 PROCEDURE — 99396 PREV VISIT EST AGE 40-64: CPT | Performed by: PHYSICIAN ASSISTANT

## 2019-06-14 PROCEDURE — 90715 TDAP VACCINE 7 YRS/> IM: CPT | Performed by: PHYSICIAN ASSISTANT

## 2019-06-14 NOTE — PROGRESS NOTES
"Subjective   Chen Martin is a 43 y.o. female presents for   Chief Complaint   Patient presents with   • Annual Exam       History of Present Illness   Chen is a 43-year-old female who presents for annual physical examination.  She has gained 40 pounds since March 1, 2019.  Elina states she is feeling well at office visit today.  She sees Dr. Lucia Cruz for her Pap smears.  Bowel movements have been daily without dark black tarry stools.  She has been making healthy choices with eating.  She is trying to lose weight by exercising and decreasing portion sizes.  Sleep has been normal.  Denied any upper respiratory symptoms, chest pain, shortness of air, dizziness, vision changes, abdominal pain, urinary symptoms, or swelling of ankles.  She has no complaints at office visit today.    The following portions of the patient's history were reviewed and updated as appropriate: allergies, current medications, past family history, past medical history, past social history, past surgical history and problem list.    Review of Systems   Constitutional: Negative.    HENT: Negative.    Eyes: Negative.    Respiratory: Negative.  Negative for cough, chest tightness, shortness of breath and wheezing.    Cardiovascular: Negative.  Negative for chest pain, palpitations and leg swelling.   Gastrointestinal: Negative.  Negative for abdominal pain, anal bleeding, blood in stool, constipation, diarrhea, nausea, rectal pain and vomiting.   Endocrine: Negative.    Genitourinary: Negative.    Musculoskeletal: Negative.    Skin: Negative.    Allergic/Immunologic: Negative.    Neurological: Negative.    Hematological: Negative.    Psychiatric/Behavioral: Negative.  Negative for sleep disturbance and suicidal ideas.   All other systems reviewed and are negative.        Vitals:    06/14/19 0931   BP: 112/78   Pulse: 100   Resp: 16   Temp: 98.2 °F (36.8 °C)   SpO2: 98%   Weight: 109 kg (240 lb)   Height: 165.1 cm (65\")     Wt Readings " from Last 3 Encounters:   06/14/19 109 kg (240 lb)   03/01/19 90.7 kg (200 lb)   12/04/18 90.7 kg (200 lb)     BP Readings from Last 3 Encounters:   06/14/19 112/78   07/11/17 133/85   06/12/17 139/82     Social History     Socioeconomic History   • Marital status:      Spouse name: Not on file   • Number of children: Not on file   • Years of education: Not on file   • Highest education level: Not on file   Tobacco Use   • Smoking status: Never Smoker   • Smokeless tobacco: Never Used   Substance and Sexual Activity   • Alcohol use: Yes     Comment: SOCIALLY   • Drug use: No   • Sexual activity: Defer       Allergies   Allergen Reactions   • Sulfa Antibiotics    • Vicodin [Hydrocodone-Acetaminophen]        Body mass index is 39.94 kg/m².    Objective   Physical Exam   Constitutional: She is oriented to person, place, and time. Vital signs are normal. She appears well-developed and well-nourished.   Obese female   HENT:   Head: Normocephalic and atraumatic.   Right Ear: Hearing, tympanic membrane, external ear and ear canal normal.   Left Ear: Hearing, tympanic membrane, external ear and ear canal normal.   Nose: Nose normal. Right sinus exhibits no maxillary sinus tenderness and no frontal sinus tenderness. Left sinus exhibits no maxillary sinus tenderness and no frontal sinus tenderness.   Mouth/Throat: Uvula is midline, oropharynx is clear and moist and mucous membranes are normal.   Eyes: Conjunctivae, EOM and lids are normal. Pupils are equal, round, and reactive to light.   Neck: Trachea normal and phonation normal. Neck supple. No edema present. No thyroid mass and no thyromegaly present.   Cardiovascular: Normal rate, regular rhythm, S1 normal, S2 normal, normal heart sounds and normal pulses.   Pulmonary/Chest: Effort normal and breath sounds normal.   Abdominal: Soft. Normal appearance, normal aorta and bowel sounds are normal. There is no hepatomegaly. There is no tenderness.   Lymphadenopathy:      She has no cervical adenopathy.   Neurological: She is alert and oriented to person, place, and time.   Reflex Scores:       Patellar reflexes are 2+ on the right side and 2+ on the left side.  Skin: Skin is warm, dry and intact. Capillary refill takes less than 2 seconds.   Psychiatric: She has a normal mood and affect. Her speech is normal and behavior is normal. Judgment and thought content normal. Cognition and memory are normal.       Assessment/Plan   Chen was seen today for annual exam.    Diagnoses and all orders for this visit:    Encounter for annual physical exam    Need for tetanus, diphtheria, and acellular pertussis (Tdap) vaccine in patient of adolescent age or older  -     Tdap Vaccine Greater Than or Equal To 6yo IM    Obesity (BMI 30-39.9)     1.  Annual physical examination.  I have reviewed her lab work from June 7, 2019 with her at office visit today.  Cholesterol 192, triglycerides 122, HDL 47, , TSH 1.17, and fasting blood sugar was 90.  She was encouraged to decrease her carbohydrates and fatty food intake.  She will also increase her physical activity.  She was given encouragement for weight loss.  Plan to follow-up in 1 year.  2.  Need for Tdap vaccination: She is given written consent to receive updated Tdap vaccination.  3.  Chronic obesity: We have discussed about joining weight watchers and making healthy choices to help her lose weight.  Chen was encouraged to increase her physical activity and to eat healthy.  Will reevaluate weight at next office visit.  She voiced understanding.      KENDELL [facility]  [address] [place] [id number]  Dept: [phone]  Dept Fax: [phone]  Loc: [phone]  Loc Fax: [phone]        Orders Only on 06/06/2019   Component Date Value Ref Range Status   • TSH 06/07/2019 1.170  0.270 - 4.200 mIU/mL Final   • Glucose 06/07/2019 90  65 - 99 mg/dL  Final   • BUN 06/07/2019 8  6 - 20 mg/dL Final   • Creatinine 06/07/2019 0.65  0.57 - 1.00 mg/dL Final   • eGFR Non African Am 06/07/2019 100  >60 mL/min/1.73 Final   • eGFR African Am 06/07/2019 121  >60 mL/min/1.73 Final   • BUN/Creatinine Ratio 06/07/2019 12.3  7.0 - 25.0 Final   • Sodium 06/07/2019 140  136 - 145 mmol/L Final   • Potassium 06/07/2019 4.5  3.5 - 5.2 mmol/L Final   • Chloride 06/07/2019 103  98 - 107 mmol/L Final   • Total CO2 06/07/2019 27.3  22.0 - 29.0 mmol/L Final   • Calcium 06/07/2019 9.7  8.6 - 10.5 mg/dL Final   • Total Protein 06/07/2019 6.8  6.0 - 8.5 g/dL Final   • Albumin 06/07/2019 4.40  3.50 - 5.20 g/dL Final   • Globulin 06/07/2019 2.4  gm/dL Final   • A/G Ratio 06/07/2019 1.8  g/dL Final   • Total Bilirubin 06/07/2019 0.5  0.2 - 1.2 mg/dL Final   • Alkaline Phosphatase 06/07/2019 89  39 - 117 U/L Final   • AST (SGOT) 06/07/2019 14  1 - 32 U/L Final   • ALT (SGPT) 06/07/2019 21  1 - 33 U/L Final   • Folate 06/07/2019 4.91  4.78 - 24.20 ng/mL Final   • Vitamin B-12 06/07/2019 344  211 - 946 pg/mL Final   • WBC 06/07/2019 6.55  3.40 - 10.80 10*3/mm3 Final   • RBC 06/07/2019 4.28  3.77 - 5.28 10*6/mm3 Final   • Hemoglobin 06/07/2019 13.6  12.0 - 15.9 g/dL Final   • Hematocrit 06/07/2019 43.7  34.0 - 46.6 % Final   • MCV 06/07/2019 102.1* 79.0 - 97.0 fL Final   • MCH 06/07/2019 31.8  26.6 - 33.0 pg Final   • MCHC 06/07/2019 31.1* 31.5 - 35.7 g/dL Final   • RDW 06/07/2019 12.5  12.3 - 15.4 % Final   • Platelets 06/07/2019 267  140 - 450 10*3/mm3 Final   • Neutrophil Rel % 06/07/2019 61.0  42.7 - 76.0 % Final   • Lymphocyte Rel % 06/07/2019 25.6  19.6 - 45.3 % Final   • Monocyte Rel % 06/07/2019 9.5  5.0 - 12.0 % Final   • Eosinophil Rel % 06/07/2019 3.1  0.3 - 6.2 % Final   • Basophil Rel % 06/07/2019 0.5  0.0 - 1.5 % Final   • Neutrophils Absolute 06/07/2019 4.00  1.70 - 7.00 10*3/mm3 Final   • Lymphocytes Absolute 06/07/2019 1.68  0.70 - 3.10 10*3/mm3 Final   • Monocytes Absolute  06/07/2019 0.62  0.10 - 0.90 10*3/mm3 Final   • Eosinophils Absolute 06/07/2019 0.20  0.00 - 0.40 10*3/mm3 Final   • Basophils Absolute 06/07/2019 0.03  0.00 - 0.20 10*3/mm3 Final   • Immature Granulocyte Rel % 06/07/2019 0.3  0.0 - 0.5 % Final   • Immature Grans Absolute 06/07/2019 0.02  0.00 - 0.05 10*3/mm3 Final   • nRBC 06/07/2019 0.0  0.0 - 0.2 /100 WBC Final   • Total Cholesterol 06/07/2019 192  0 - 200 mg/dL Final   • Triglycerides 06/07/2019 122  0 - 150 mg/dL Final   • HDL Cholesterol 06/07/2019 47  40 - 60 mg/dL Final   • VLDL Cholesterol 06/07/2019 24.4  5 - 40 mg/dL Final   • LDL Cholesterol  06/07/2019 121* 0 - 100 mg/dL Final   • Written Authorization 06/07/2019 Comment   Final    Comment: Written Authorization Received.  Authorization received from DONG CANO MA 06-  Logged by Gayle Alba

## 2019-11-18 ENCOUNTER — HOSPITAL ENCOUNTER (OUTPATIENT)
Dept: GENERAL RADIOLOGY | Facility: HOSPITAL | Age: 44
Discharge: HOME | End: 2019-11-18
Admitting: NURSE PRACTITIONER

## 2019-11-18 ENCOUNTER — OFFICE VISIT (OUTPATIENT)
Dept: FAMILY MEDICINE CLINIC | Facility: CLINIC | Age: 44
End: 2019-11-18

## 2019-11-18 VITALS
OXYGEN SATURATION: 98 % | TEMPERATURE: 99.8 F | HEART RATE: 96 BPM | HEIGHT: 65 IN | WEIGHT: 231 LBS | DIASTOLIC BLOOD PRESSURE: 80 MMHG | BODY MASS INDEX: 38.49 KG/M2 | SYSTOLIC BLOOD PRESSURE: 122 MMHG | RESPIRATION RATE: 16 BRPM

## 2019-11-18 DIAGNOSIS — R10.9 FLANK PAIN: ICD-10-CM

## 2019-11-18 DIAGNOSIS — R11.0 NAUSEA: ICD-10-CM

## 2019-11-18 DIAGNOSIS — R10.11 RIGHT UPPER QUADRANT ABDOMINAL PAIN: Primary | ICD-10-CM

## 2019-11-18 LAB
BILIRUB BLD-MCNC: NEGATIVE MG/DL
CLARITY, POC: ABNORMAL
COLOR UR: ABNORMAL
GLUCOSE UR STRIP-MCNC: NEGATIVE MG/DL
KETONES UR QL: ABNORMAL
LEUKOCYTE EST, POC: NEGATIVE
NITRITE UR-MCNC: NEGATIVE MG/ML
PH UR: 6.5 [PH] (ref 5–8)
PROT UR STRIP-MCNC: NEGATIVE MG/DL
RBC # UR STRIP: ABNORMAL /UL
SP GR UR: 1.01 (ref 1–1.03)
UROBILINOGEN UR QL: NORMAL

## 2019-11-18 PROCEDURE — 74018 RADEX ABDOMEN 1 VIEW: CPT

## 2019-11-18 PROCEDURE — 99213 OFFICE O/P EST LOW 20 MIN: CPT | Performed by: NURSE PRACTITIONER

## 2019-11-18 PROCEDURE — 96372 THER/PROPH/DIAG INJ SC/IM: CPT | Performed by: NURSE PRACTITIONER

## 2019-11-18 PROCEDURE — 81002 URINALYSIS NONAUTO W/O SCOPE: CPT | Performed by: NURSE PRACTITIONER

## 2019-11-18 RX ORDER — KETOROLAC TROMETHAMINE 30 MG/ML
30 INJECTION, SOLUTION INTRAMUSCULAR; INTRAVENOUS ONCE
Status: COMPLETED | OUTPATIENT
Start: 2019-11-18 | End: 2019-11-18

## 2019-11-18 RX ORDER — ONDANSETRON 4 MG/1
4 TABLET, FILM COATED ORAL EVERY 8 HOURS PRN
Qty: 20 TABLET | Refills: 0 | Status: SHIPPED | OUTPATIENT
Start: 2019-11-18 | End: 2021-01-06

## 2019-11-18 RX ADMIN — KETOROLAC TROMETHAMINE 30 MG: 30 INJECTION, SOLUTION INTRAMUSCULAR; INTRAVENOUS at 16:13

## 2019-11-18 NOTE — PROGRESS NOTES
Patient ID: Chen Martin is a 44 y.o. female     Subjective     Chief Complaint   Patient presents with   • Abdominal Pain     X 2 days       History of Present Illness    Chen Martin presents to the office today for right sided abdominal pain with radiation to right flank.  Onset of symptoms was Saturday.  She had traveled out of state for a conference.  Along with abdominal pain she is have fatigue and weakness along with nausea.  She checked her temp earlier this morning with a temporal thermometer and states that it was 101.  She has taken ibuprofen with no improvement of the abdominal pain.  About a year ago, she ended up in the emergency room due to similar symptoms and was found to have gallstone with gallbladder sludge.  Her symptoms improved after couple of days and was not sent for further evaluation concerning gallbladder.  Has not had any other episodes since then.  Denies any hematuria,dysuria, constipation, or diarrhea.    She denies any complaints of chills, cough, chest pain, shortness of air, or any other concerns.     The following portions of the patient's history were reviewed and updated as appropriate: allergies, current medications, past family history, past medical history, past social history, past surgical history and problem list.       Review of Systems   Constitution: Positive for fever (101), weakness and malaise/fatigue.   HENT: Negative.    Eyes: Negative.    Cardiovascular: Negative.    Respiratory: Negative.    Endocrine: Negative.    Hematologic/Lymphatic: Negative.    Skin: Negative.    Gastrointestinal: Positive for abdominal pain and nausea. Negative for vomiting.   Genitourinary: Positive for flank pain. Negative for dysuria, frequency and urgency.   Psychiatric/Behavioral: Negative.    All other systems reviewed and are negative.      Vitals:    11/18/19 1541   BP: 122/80   Pulse: 96   Resp: 16   Temp: 99.8 °F (37.7 °C)   SpO2: 98%       Documented weights    11/18/19  1541   Weight: 105 kg (231 lb)     Body mass index is 38.44 kg/m².    Results for orders placed or performed in visit on 11/18/19   POCT urinalysis dipstick, manual   Result Value Ref Range    Color Dark Yellow Yellow, Straw, Dark Yellow, [place]    Clarity, UA Hazy (A) Clear    Glucose, UA Negative Negative, 1000 mg/dL (3+) mg/dL    Bilirubin Negative Negative    Ketones, UA 2+ (A) Negative    Specific Gravity  1.010 1.005 - 1.030    Blood, UA Trace (A) Negative    pH, Urine 6.5 5.0 - 8.0    Protein, POC Negative Negative mg/dL    Urobilinogen, UA Normal Normal    Leukocytes Negative Negative    Nitrite, UA Negative Negative       Objective     Physical Exam   Constitutional: She is oriented to person, place, and time. No distress.   HENT:   Head: Normocephalic and atraumatic.   Neck: Normal range of motion. No JVD present.   Cardiovascular: Normal rate and regular rhythm.   No murmur heard.  Pulmonary/Chest: Effort normal and breath sounds normal. She has no wheezes.   Abdominal: Soft. Bowel sounds are normal. She exhibits no distension. There is no hepatosplenomegaly. There is tenderness in the right upper quadrant. There is CVA tenderness (right).   Musculoskeletal: Normal range of motion. She exhibits no edema.   Neurological: She is alert and oriented to person, place, and time.   Skin: Skin is warm and dry. No rash noted.   Psychiatric: She has a normal mood and affect.       Assessment/Plan     Assessment/Plan     Chen was seen today for abdominal pain.    Diagnoses and all orders for this visit:    Right upper quadrant abdominal pain  -     CBC & Differential  -     Comprehensive Metabolic Panel  -     ketorolac (TORADOL) injection 30 mg  -     XR abdomen 1 vw  -     US Gallbladder; Future  -     ondansetron (ZOFRAN) 4 MG tablet; Take 1 tablet by mouth Every 8 (Eight) Hours As Needed for Nausea or Vomiting.    Flank pain  -     POCT urinalysis dipstick, manual  -     XR abdomen 1 vw    Nausea  -      ondansetron (ZOFRAN) 4 MG tablet; Take 1 tablet by mouth Every 8 (Eight) Hours As Needed for Nausea or Vomiting.      Summary:  Chen Martin presents to office today with right upper abdominal pain.  Suspect gallbladder.  Toradol 30 mg IM today for pain.  Will check CBC and CMP and call results.  Will check x-ray of abdomen today and will call with results.  Will order for gallbladder ultrasound, hopefully will get completed tomorrow.  Will call with results once done.  In the meantime instructed to avoid any fried fatty spicy foods.  Clear liquid diet advance as tolerated.  Zofran as needed for nausea.  Instructed if her symptoms worsen throughout the night, to go directly to the emergency room for evaluation.  Patient verbalized understanding.    In the meantime, instructed to contact us sooner for any problems or concerns.    Mercedes Workman, APRN  Family Medicine  [facility]  11/18/19  5:09 PM

## 2019-11-19 LAB
ALBUMIN SERPL-MCNC: 4.6 G/DL (ref 3.5–5.5)
ALBUMIN/GLOB SERPL: 2.2 {RATIO} (ref 1.2–2.2)
ALP SERPL-CCNC: 102 IU/L (ref 39–117)
ALT SERPL-CCNC: 23 IU/L (ref 0–32)
AST SERPL-CCNC: 19 IU/L (ref 0–40)
BASOPHILS # BLD AUTO: 0 X10E3/UL (ref 0–0.2)
BASOPHILS NFR BLD AUTO: 0 %
BILIRUB SERPL-MCNC: 0.6 MG/DL (ref 0–1.2)
BUN SERPL-MCNC: 6 MG/DL (ref 6–24)
BUN/CREAT SERPL: 10 (ref 9–23)
CALCIUM SERPL-MCNC: 8.9 MG/DL (ref 8.7–10.2)
CHLORIDE SERPL-SCNC: 103 MMOL/L (ref 96–106)
CO2 SERPL-SCNC: 20 MMOL/L (ref 20–29)
CREAT SERPL-MCNC: 0.63 MG/DL (ref 0.57–1)
EOSINOPHIL # BLD AUTO: 0.1 X10E3/UL (ref 0–0.4)
EOSINOPHIL NFR BLD AUTO: 1 %
ERYTHROCYTE [DISTWIDTH] IN BLOOD BY AUTOMATED COUNT: 12.3 % (ref 12.3–15.4)
GLOBULIN SER CALC-MCNC: 2.1 G/DL (ref 1.5–4.5)
GLUCOSE SERPL-MCNC: 167 MG/DL (ref 65–99)
HCT VFR BLD AUTO: 40 % (ref 34–46.6)
HGB BLD-MCNC: 13.2 G/DL (ref 11.1–15.9)
IMM GRANULOCYTES # BLD AUTO: 0 X10E3/UL (ref 0–0.1)
IMM GRANULOCYTES NFR BLD AUTO: 0 %
LYMPHOCYTES # BLD AUTO: 0.7 X10E3/UL (ref 0.7–3.1)
LYMPHOCYTES NFR BLD AUTO: 7 %
MCH RBC QN AUTO: 31.5 PG (ref 26.6–33)
MCHC RBC AUTO-ENTMCNC: 33 G/DL (ref 31.5–35.7)
MCV RBC AUTO: 96 FL (ref 79–97)
MONOCYTES # BLD AUTO: 1 X10E3/UL (ref 0.1–0.9)
MONOCYTES NFR BLD AUTO: 11 %
NEUTROPHILS # BLD AUTO: 8 X10E3/UL (ref 1.4–7)
NEUTROPHILS NFR BLD AUTO: 81 %
PLATELET # BLD AUTO: 313 X10E3/UL (ref 150–450)
POTASSIUM SERPL-SCNC: 4.1 MMOL/L (ref 3.5–5.2)
PROT SERPL-MCNC: 6.7 G/DL (ref 6–8.5)
RBC # BLD AUTO: 4.19 X10E6/UL (ref 3.77–5.28)
SODIUM SERPL-SCNC: 138 MMOL/L (ref 134–144)
WBC # BLD AUTO: 9.8 X10E3/UL (ref 3.4–10.8)

## 2019-11-19 NOTE — PROGRESS NOTES
Notify Chen Martin her labs show normal WBC which is good.  Her kidney and liver function tests are also normal.

## 2019-12-23 ENCOUNTER — OFFICE VISIT (OUTPATIENT)
Dept: ORTHOPEDIC SURGERY | Facility: CLINIC | Age: 44
End: 2019-12-23

## 2019-12-23 VITALS — BODY MASS INDEX: 35.82 KG/M2 | HEIGHT: 65 IN | WEIGHT: 215 LBS

## 2019-12-23 DIAGNOSIS — M17.11 PRIMARY OSTEOARTHRITIS OF RIGHT KNEE: ICD-10-CM

## 2019-12-23 DIAGNOSIS — R52 PAIN: Primary | ICD-10-CM

## 2019-12-23 PROCEDURE — 20610 DRAIN/INJ JOINT/BURSA W/O US: CPT | Performed by: NURSE PRACTITIONER

## 2019-12-23 PROCEDURE — 99213 OFFICE O/P EST LOW 20 MIN: CPT | Performed by: NURSE PRACTITIONER

## 2019-12-23 PROCEDURE — 73562 X-RAY EXAM OF KNEE 3: CPT | Performed by: NURSE PRACTITIONER

## 2019-12-23 RX ORDER — CETIRIZINE HYDROCHLORIDE 10 MG/1
10 TABLET ORAL DAILY
COMMUNITY

## 2019-12-23 RX ORDER — IBUPROFEN 600 MG/1
600 TABLET ORAL EVERY 6 HOURS PRN
Qty: 90 TABLET | Refills: 4 | Status: SHIPPED | OUTPATIENT
Start: 2019-12-23 | End: 2020-07-29

## 2019-12-23 RX ORDER — TRIAMCINOLONE ACETONIDE 40 MG/ML
80 INJECTION, SUSPENSION INTRA-ARTICULAR; INTRAMUSCULAR
Status: COMPLETED | OUTPATIENT
Start: 2019-12-23 | End: 2019-12-23

## 2019-12-23 RX ORDER — LIDOCAINE HYDROCHLORIDE 10 MG/ML
8 INJECTION, SOLUTION EPIDURAL; INFILTRATION; INTRACAUDAL; PERINEURAL
Status: COMPLETED | OUTPATIENT
Start: 2019-12-23 | End: 2019-12-23

## 2019-12-23 RX ADMIN — TRIAMCINOLONE ACETONIDE 80 MG: 40 INJECTION, SUSPENSION INTRA-ARTICULAR; INTRAMUSCULAR at 11:27

## 2019-12-23 RX ADMIN — LIDOCAINE HYDROCHLORIDE 8 ML: 10 INJECTION, SOLUTION EPIDURAL; INFILTRATION; INTRACAUDAL; PERINEURAL at 11:27

## 2019-12-23 NOTE — PROGRESS NOTES
Subjective:     Patient ID: Chen Martin is a 44 y.o. female.    Chief Complaint:  Bilateral knee pain, right knee injury  History of Present Illness  Chen Martin presents to clinic today for evaluation of bilateral knee pain.  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  Began immediately experiencing pain difficulty walking right lower extremity.  She has rested, iced, elevated, ibuprofen which is helped decrease the pain however still present with activities involving deep flexion, standing for extended periods of time, ambulating on concrete surfaces.  Pain does not radiate into the groin or to the lateral aspect of the hip.  Denies previous x-ray, MRI or CT of the knees in the past.  Right knee pain greater than that of the left but also notices popping, grinding at the anterior aspect of bilateral knees.  Denies previously receiving corticosteroid injections bilateral knees in the past.  She is not experiencing numbness or tingling down bilateral lower extremities again pain is not radiating into the lateral aspect of the hip nor into the groin.  Has currently been taking ibuprofen only.  Rates discomfort at worst a 5-6 out of a 10 aching sharp throbbing in nature.  Denies that the knees are feeling as if they are getting give out however over the weekend felt that the knee was going to fall backward on her.  Denies any previous surgical intervention bilateral knees.  Denies other concerns present this time.    Social History     Occupational History   • Not on file   Tobacco Use   • Smoking status: Never Smoker   • Smokeless tobacco: Never Used   Substance and Sexual Activity   • Alcohol use: Yes     Comment: SOCIALLY   • Drug use: No   • Sexual activity: Defer      Past Medical History:   Diagnosis Date   • Migraine      Past Surgical History:   Procedure Laterality Date   •   "SECTION     • LAPAROSCOPIC CHOLECYSTECTOMY  11/20/2019    [facility]   • SINUS SURGERY         Family History   Problem Relation Age of Onset   • Osteoporosis Mother    • Diabetes Brother    • Heart disease Maternal Grandmother    • Hypertension Maternal Grandmother    • Glaucoma Maternal Grandmother          Review of Systems   Constitutional: Negative for chills, diaphoresis, fever and unexpected weight change.   HENT: Negative for hearing loss, nosebleeds, sore throat and tinnitus.    Eyes: Negative for pain and visual disturbance.   Respiratory: Negative for cough, shortness of breath and wheezing.    Cardiovascular: Negative for chest pain and palpitations.   Gastrointestinal: Negative for abdominal pain, diarrhea, nausea and vomiting.   Endocrine: Negative for cold intolerance, heat intolerance and polydipsia.   Genitourinary: Negative for difficulty urinating, dysuria and hematuria.   Musculoskeletal: Positive for arthralgias. Negative for joint swelling and myalgias.   Skin: Negative for rash and wound.   Allergic/Immunologic: Negative for environmental allergies.   Neurological: Negative for dizziness, syncope and numbness.   Hematological: Does not bruise/bleed easily.   Psychiatric/Behavioral: Negative for dysphoric mood and sleep disturbance. The patient is not nervous/anxious.            Objective:  Physical Exam  General: No acute distress.  Eyes: conjunctiva clear; pupils equally round and reactive  ENT: external ears and nose atraumatic; oropharynx clear  CV: no peripheral edema  Resp: normal respiratory effort  Skin: no rashes or wounds; normal turgor  Psych: mood and affect appropriate; recent and remote memory intact    Vitals:    12/23/19 1043   Weight: 97.5 kg (215 lb)   Height: 165.1 cm (65\")         12/23/19  1043   Weight: 97.5 kg (215 lb)     Body mass index is 35.78 kg/m².      Right Knee Exam     Tenderness   The patient is experiencing tenderness in the medial joint line and patella.    Range " of Motion   Extension: 0   Flexion: 120     Tests   Delaney:  Medial - positive Lateral - negative  Varus: negative Valgus: negative  Lachman:  Anterior - 1+    Posterior - negative  Drawer:  Anterior - negative    Posterior - negative  Patellar apprehension: negative    Other   Erythema: absent  Scars: absent  Sensation: normal  Pulse: present  Swelling: mild  Effusion: no effusion present    Comments:  Positive crepitus throughout arc of motion  Negative active patellar compression test      Left Knee Exam     Tenderness   The patient is experiencing tenderness in the patella.    Range of Motion   Extension: 0   Flexion: 120     Tests   Delaney:  Medial - negative Lateral - negative  Varus: negative Valgus: negative  Lachman:  Anterior - 1+    Posterior - negative  Drawer:  Anterior - negative     Posterior - negative  Patellar apprehension: negative    Other   Erythema: absent  Scars: absent  Sensation: normal  Pulse: present  Swelling: mild  Effusion: no effusion present    Comments:  Positive crepitus throughout arc of motion  Negative active patellar compression test          Imaging:  Bilateral Knee X-Ray  Indication: Pain    AP, Lateral, and Parkin views    Findings:  No fracture  No bony lesion  Normal soft tissues  Moderate medial compartment narrowing with slight osteophyte formation medial femoral condyle, lateral tibial plateau right knee left knee mild to moderate medial compartment narrowing, mild to moderate patellofemoral narrowing bilateral knees with slight osteophyte formation noted superior  patellar pole right knee    No prior studies were available for comparison.    Assessment:        1. Pain    2. Primary osteoarthritis of right knee           Plan:  1.  Discussed plan of care with patient.  Wish to proceed with corticosteroid injection right knee.  We will plan to see her back if not improving.  She verbalized understanding of all information agrees with plan of care.  We will plan to  start ibuprofen 600 mg 3 times daily as needed.  Provided her with home strengthening exercises for her to complete for quad, hamstring, calf strengthening.  She verbalized understanding of all information agrees with plan of care.  Denies other concerns present this time.  Large Joint Arthrocentesis: R knee  Date/Time: 12/23/2019 11:27 AM  Consent given by: patient  Site marked: site marked  Timeout: Immediately prior to procedure a time out was called to verify the correct patient, procedure, equipment, support staff and site/side marked as required   Supporting Documentation  Indications: pain   Procedure Details  Location: knee - R knee  Preparation: Patient was prepped and draped in the usual sterile fashion  Needle size: 22 G  Approach: superior  Medications administered: 8 mL lidocaine PF 1% 1 %; 80 mg triamcinolone acetonide 40 MG/ML  Patient tolerance: patient tolerated the procedure well with no immediate complications          Orders:  Orders Placed This Encounter   Procedures   • Large Joint Arthrocentesis: R knee   • XR Knee 3 View Bilateral         I ordered and reviewed the EARLE today.     Dictated utilizing Dragon dictation

## 2020-07-29 ENCOUNTER — DOCUMENTATION (OUTPATIENT)
Dept: ORTHOPEDIC SURGERY | Facility: CLINIC | Age: 45
End: 2020-07-29

## 2020-07-29 RX ORDER — PREDNISONE 1 MG/1
5 TABLET ORAL DAILY
Qty: 21 TABLET | Refills: 0 | Status: SHIPPED | OUTPATIENT
Start: 2020-07-29 | End: 2020-08-05

## 2020-07-29 RX ORDER — DICLOFENAC SODIUM 75 MG/1
75 TABLET, DELAYED RELEASE ORAL 2 TIMES DAILY
Qty: 60 TABLET | Refills: 3 | Status: SHIPPED | OUTPATIENT
Start: 2020-07-29 | End: 2020-12-15

## 2020-07-30 ENCOUNTER — TELEPHONE (OUTPATIENT)
Dept: ORTHOPEDIC SURGERY | Facility: CLINIC | Age: 45
End: 2020-07-30

## 2020-07-30 DIAGNOSIS — M25.562 MECHANICAL KNEE PAIN, LEFT: Primary | ICD-10-CM

## 2020-07-30 NOTE — TELEPHONE ENCOUNTER
Patient with questions regarding worsening pain bilateral knees left knee.  Pain in right.  Rates discomfort left knee in 7-8 out of a 10 aching throbbing stabbing sharp shooting does feel as if it will give out on her from time to time.  Maximal tenderness present anterior aspect, greater tenderness the medial joint line.  Increased pain noted with transitional activity such as from seated standing attempting to walk, ambulating long distances, standing for extended periods of time, activities involving deep flexion.  Again does feel as if the knee is getting give out on her does feel a catching sensation.  Has had x-ray images available for viewing in clinic denies any previous MRI or CT to the knee in the past.  Has received corticosteroid injection with mild symptom relief however with increased activity has noted pain.  She has tried taking ibuprofen 600 mg 3 times daily with minimal symptom relief.  She has tried rest, ice and elevation.  Denies known injury other than recent exercise.    ROM: 0 degrees - 125 degrees, stable to varus and valgus stress at 0 degrees and 30 degrees, positive medial Delaney's, negative lateral Delaney's, positive crepitus throughout arc of motion, negative active patellar compression test, positive sensation light touch all distributions left lower extremity, mild swelling, negative erythema, negative warmth, 2+ dorsalis pedis pulse, flex extend all digits left foot, negative Homans sign, negative calf tenderness    Plan: Discussed plan of care with patient.  Encouraged to discontinue all ibuprofen and other anti-inflammatory medications.  Will start prednisone taper followed by diclofenac once in the morning once in the evening.  Discussed to eat with medication.  Plan to proceed with MRI to evaluate loose body left knee.  She verbalized understanding of all information agrees with plan of care.  Denies other concerns present time.

## 2020-07-30 NOTE — PROGRESS NOTES
A user error has taken place: encounter opened in error, closed for administrative reasons. Documentation placed in telephone encounter.

## 2020-08-05 ENCOUNTER — OFFICE VISIT (OUTPATIENT)
Dept: ORTHOPEDIC SURGERY | Facility: CLINIC | Age: 45
End: 2020-08-05

## 2020-08-05 VITALS — WEIGHT: 215 LBS | HEIGHT: 65 IN | BODY MASS INDEX: 35.82 KG/M2

## 2020-08-05 DIAGNOSIS — M17.12 PRIMARY OSTEOARTHRITIS OF LEFT KNEE: Primary | ICD-10-CM

## 2020-08-05 PROCEDURE — 20610 DRAIN/INJ JOINT/BURSA W/O US: CPT | Performed by: NURSE PRACTITIONER

## 2020-08-05 RX ORDER — LIDOCAINE HYDROCHLORIDE 10 MG/ML
8 INJECTION, SOLUTION EPIDURAL; INFILTRATION; INTRACAUDAL; PERINEURAL
Status: COMPLETED | OUTPATIENT
Start: 2020-08-05 | End: 2020-08-05

## 2020-08-05 RX ORDER — TRIAMCINOLONE ACETONIDE 40 MG/ML
80 INJECTION, SUSPENSION INTRA-ARTICULAR; INTRAMUSCULAR
Status: COMPLETED | OUTPATIENT
Start: 2020-08-05 | End: 2020-08-05

## 2020-08-05 RX ADMIN — LIDOCAINE HYDROCHLORIDE 8 ML: 10 INJECTION, SOLUTION EPIDURAL; INFILTRATION; INTRACAUDAL; PERINEURAL at 13:23

## 2020-08-05 RX ADMIN — TRIAMCINOLONE ACETONIDE 80 MG: 40 INJECTION, SUSPENSION INTRA-ARTICULAR; INTRAMUSCULAR at 13:23

## 2020-08-05 NOTE — PROGRESS NOTES
Procedure   Large Joint Arthrocentesis: L knee  Date/Time: 8/5/2020 1:23 PM  Consent given by: patient  Site marked: site marked  Timeout: Immediately prior to procedure a time out was called to verify the correct patient, procedure, equipment, support staff and site/side marked as required   Supporting Documentation  Indications: pain   Procedure Details  Location: knee - L knee  Preparation: Patient was prepped and draped in the usual sterile fashion  Needle size: 22 G  Approach: superior (LATERAL KNEE)  Medications administered: 8 mL lidocaine PF 1% 1 %; 80 mg triamcinolone acetonide 40 MG/ML  Patient tolerance: patient tolerated the procedure well with no immediate complications            Returns today for corticosteroid injection left knee.  She has completed MRI was called to discuss results MRI previously completed outside facility with results scanned in.  Does wish to proceed with corticosteroid injection to the left knee.  Discussed application of ice continue with range of motion we will plan to see her back in clinic in 6 weeks if she is not improving.  She has completed prednisone taper, started Voltaren 75 mg 1 tablet twice daily as of today.  She would like to continue with physical strengthening encouraged to avoid activities involving deep squats, lunges, steps.  She verbalized understanding of all information and agrees with plan of care.  Denies other concerns present time.

## 2020-09-29 ENCOUNTER — CLINICAL SUPPORT (OUTPATIENT)
Dept: ORTHOPEDIC SURGERY | Facility: CLINIC | Age: 45
End: 2020-09-29

## 2020-09-29 VITALS — HEIGHT: 65 IN | WEIGHT: 215 LBS | BODY MASS INDEX: 35.82 KG/M2

## 2020-09-29 DIAGNOSIS — M17.11 PRIMARY OSTEOARTHRITIS OF RIGHT KNEE: ICD-10-CM

## 2020-09-29 DIAGNOSIS — M25.561 RIGHT KNEE PAIN, UNSPECIFIED CHRONICITY: Primary | ICD-10-CM

## 2020-09-29 PROCEDURE — 20610 DRAIN/INJ JOINT/BURSA W/O US: CPT | Performed by: NURSE PRACTITIONER

## 2020-09-29 RX ORDER — LIDOCAINE HYDROCHLORIDE 10 MG/ML
8 INJECTION, SOLUTION EPIDURAL; INFILTRATION; INTRACAUDAL; PERINEURAL
Status: COMPLETED | OUTPATIENT
Start: 2020-09-29 | End: 2020-09-29

## 2020-09-29 RX ORDER — TRIAMCINOLONE ACETONIDE 40 MG/ML
80 INJECTION, SUSPENSION INTRA-ARTICULAR; INTRAMUSCULAR
Status: COMPLETED | OUTPATIENT
Start: 2020-09-29 | End: 2020-09-29

## 2020-09-29 RX ORDER — METHYLPREDNISOLONE 4 MG/1
TABLET ORAL
Qty: 21 TABLET | Refills: 0 | Status: SHIPPED | OUTPATIENT
Start: 2020-09-29 | End: 2021-01-06 | Stop reason: ALTCHOICE

## 2020-09-29 RX ADMIN — LIDOCAINE HYDROCHLORIDE 8 ML: 10 INJECTION, SOLUTION EPIDURAL; INFILTRATION; INTRACAUDAL; PERINEURAL at 11:42

## 2020-09-29 RX ADMIN — TRIAMCINOLONE ACETONIDE 80 MG: 40 INJECTION, SUSPENSION INTRA-ARTICULAR; INTRAMUSCULAR at 11:42

## 2020-09-29 NOTE — PROGRESS NOTES
Large Joint Arthrocentesis: R knee  Date/Time: 9/29/2020 11:42 AM  Consent given by: patient  Site marked: site marked  Timeout: Immediately prior to procedure a time out was called to verify the correct patient, procedure, equipment, support staff and site/side marked as required   Supporting Documentation  Indications: pain   Procedure Details  Location: knee - R knee  Preparation: Patient was prepped and draped in the usual sterile fashion  Needle size: 22 G  Approach: superior  Medications administered: 8 mL lidocaine PF 1% 1 %; 80 mg triamcinolone acetonide 40 MG/ML  Patient tolerance: patient tolerated the procedure well with no immediate complications        Presents today for corticosteroid injection right knee only.  Patient prepped Betadine, injection administered tolerated extremely well.  Encouraged application of ice this evening and injection site and range of motion.  Plan to see her back in clinic if not improving.  She verbalized understanding of information is plan of care.  Denies other concerns present time.

## 2020-12-15 RX ORDER — DICLOFENAC SODIUM 75 MG/1
75 TABLET, DELAYED RELEASE ORAL 2 TIMES DAILY
Qty: 60 TABLET | Refills: 3 | Status: SHIPPED | OUTPATIENT
Start: 2020-12-15 | End: 2021-06-07

## 2021-01-06 ENCOUNTER — OFFICE VISIT (OUTPATIENT)
Dept: ORTHOPEDIC SURGERY | Facility: CLINIC | Age: 46
End: 2021-01-06

## 2021-01-06 VITALS
WEIGHT: 215 LBS | DIASTOLIC BLOOD PRESSURE: 76 MMHG | BODY MASS INDEX: 35.82 KG/M2 | SYSTOLIC BLOOD PRESSURE: 135 MMHG | HEIGHT: 65 IN | HEART RATE: 71 BPM

## 2021-01-06 DIAGNOSIS — R52 PAIN: Primary | ICD-10-CM

## 2021-01-06 DIAGNOSIS — S93.402A MODERATE LEFT ANKLE SPRAIN, INITIAL ENCOUNTER: ICD-10-CM

## 2021-01-06 PROBLEM — Z90.49 S/P LAPAROSCOPIC CHOLECYSTECTOMY: Status: ACTIVE | Noted: 2019-11-25

## 2021-01-06 PROBLEM — K80.20 SYMPTOMATIC CHOLELITHIASIS: Status: ACTIVE | Noted: 2019-11-19

## 2021-01-06 PROCEDURE — 73610 X-RAY EXAM OF ANKLE: CPT | Performed by: NURSE PRACTITIONER

## 2021-01-06 PROCEDURE — 99214 OFFICE O/P EST MOD 30 MIN: CPT | Performed by: NURSE PRACTITIONER

## 2021-01-06 RX ORDER — PREDNISONE 1 MG/1
TABLET ORAL
Qty: 21 TABLET | Refills: 0 | Status: SHIPPED | OUTPATIENT
Start: 2021-01-06 | End: 2021-06-21

## 2021-01-06 NOTE — PROGRESS NOTES
Subjective:     Patient ID: Chen Martin is a 45 y.o. female.    Chief Complaint:  Left ankle injury  History of Present Illness  Chen Martin presents to clinic for evaluation of her left ankle.  Maximal tenderness present the anterior lateral, lateral aspect of the ankle.  1 week ago was stepping down off of a step when she twisted the ankle heard a loud pop followed by onset of pain and swelling.  Did have ibuprofen which she started at the time.  Continued ibuprofen for the next 3 days as well as ice, rest, elevation.  She has been able to continue weightbearing as tolerated.  She does continue to walk with a limp and maximal tenderness present at the left ankle with all weightbearing activities.  Has noted ecchymosis present as well.  Denies presence of numbness or tingling to the left lower extremity.  She did fall striking the anterior aspect of the knee does have abrasions at the right knee but otherwise right knee stable at this time.  Denies other concerns present time.    Social History     Occupational History   • Not on file   Tobacco Use   • Smoking status: Never Smoker   • Smokeless tobacco: Never Used   Substance and Sexual Activity   • Alcohol use: Yes     Comment: SOCIALLY   • Drug use: No   • Sexual activity: Defer      Past Medical History:   Diagnosis Date   • Migraine      Past Surgical History:   Procedure Laterality Date   •  SECTION     • LAPAROSCOPIC CHOLECYSTECTOMY  2019    [facility]   • SINUS SURGERY         Family History   Problem Relation Age of Onset   • Osteoporosis Mother    • Diabetes Brother    • Heart disease Maternal Grandmother    • Hypertension Maternal Grandmother    • Glaucoma Maternal Grandmother          Review of Systems   Constitutional: Negative for chills, diaphoresis and unexpected weight change.   HENT: Negative for hearing loss, nosebleeds, sore throat and tinnitus.    Eyes: Negative for pain and visual disturbance.   Respiratory: Negative for  "cough, shortness of breath and wheezing.    Cardiovascular: Negative for chest pain and palpitations.   Gastrointestinal: Negative for abdominal pain, diarrhea, nausea and vomiting.   Endocrine: Negative for cold intolerance, heat intolerance and polydipsia.   Genitourinary: Negative for difficulty urinating, dyspareunia and hematuria.   Musculoskeletal: Positive for gait problem and joint swelling. Negative for arthralgias.   Skin: Negative for rash and wound.   Allergic/Immunologic: Negative for environmental allergies.   Neurological: Negative for dizziness, syncope and numbness.   Hematological: Does not bruise/bleed easily.   Psychiatric/Behavioral: Negative for dysphoric mood and sleep disturbance. The patient is not nervous/anxious.            Objective:  Physical Exam    Vital signs reviewed.   General: No acute distress.  Eyes: conjunctiva clear; pupils equally round and reactive  ENT: external ears and nose atraumatic; oropharynx clear  CV: no peripheral edema  Resp: normal respiratory effort  Skin: no rashes or wounds; normal turgor  Psych: mood and affect appropriate; recent and remote memory intact    Vitals:    01/06/21 0810   BP: 135/76   BP Location: Right arm   Pulse: 71   Weight: 97.5 kg (215 lb)   Height: 165.1 cm (65\")         01/06/21  0810   Weight: 97.5 kg (215 lb)     Body mass index is 35.78 kg/m².      Left Ankle Exam     Tenderness   The patient is experiencing tenderness in the ATF and CF.   Swelling: moderate    Range of Motion   Dorsiflexion: 25   Plantar flexion: 45   Eversion: 25   Inversion: 45     Comments:  4+ out of 5 strength dorsiflexion  Plantarflexion strength 4+ out of 5  Eversion strength 4- out of 5 against resistance, inversion strength 4 out of 5 against resistance, moderate swelling, ecchymosis the lateral aspect of the ankle and foot  Positive sensation light touch the dorsal and plantar surface of the foot  2+ dorsalis pedis pulse  Flex extend all digits left " foot                 Imaging:  Left Ankle X-Ray  Indication: Pain  Views: AP, Lateral, Mortise  Findings:  No fracture  No bony lesion  Mild/moderate soft tissue swelling lateral malleolus  Normal joint spaces  No prior studies available for comparison.    Assessment:        1. Pain    2. Moderate left ankle sprain, initial encounter           Plan:  1.  Discussed plan of care with patient.  We will continue weightbearing as tolerated.  2.  Walking boot applied to the left lower extremity.  Discussed with patient weightbearing in boot x2 weeks to further reduce the swelling.  Will start prednisone taper provided with home strengthening exercises and range of motion to complete.  If not improving gladly see her back in clinic.  She verbalized understanding of all information agrees with plan of care.  Denies other concerns present time.  Orders:  Orders Placed This Encounter   Procedures   • XR Ankle 3+ View Left       Medications:  New Medications Ordered This Visit   Medications   • predniSONE (DELTASONE) 5 MG tablet     Si tabs day 1, 5 tabs day 2, 4 tabs day 3, 3 tabs day 4, 2 tabs day 5, 1 tab day 6     Dispense:  21 tablet     Refill:  0       Followup:  No follow-ups on file.    Diagnoses and all orders for this visit:    1. Pain (Primary)  -     XR Ankle 3+ View Left    2. Moderate left ankle sprain, initial encounter    Other orders  -     predniSONE (DELTASONE) 5 MG tablet; 6 tabs day 1, 5 tabs day 2, 4 tabs day 3, 3 tabs day 4, 2 tabs day 5, 1 tab day 6  Dispense: 21 tablet; Refill: 0        Dictated utilizing Dragon dictation

## 2021-06-07 RX ORDER — DICLOFENAC SODIUM 75 MG/1
75 TABLET, DELAYED RELEASE ORAL 2 TIMES DAILY
Qty: 60 TABLET | Refills: 3 | Status: SHIPPED | OUTPATIENT
Start: 2021-06-07 | End: 2021-10-21

## 2021-06-14 ENCOUNTER — TELEPHONE (OUTPATIENT)
Dept: FAMILY MEDICINE CLINIC | Facility: CLINIC | Age: 46
End: 2021-06-14

## 2021-06-14 NOTE — TELEPHONE ENCOUNTER
Spoke with pt. I have made a fasting lab appointment for 6/16/21. I have also rescheduled her physical to 6/21/21 so that she can see her pcp

## 2021-06-14 NOTE — TELEPHONE ENCOUNTER
Caller: Chen Martin    Relationship to patient: Self    Best call back number: [phone]    Patient is needing: PATIENT IS SCHEDULED WITH [facility] FOR A PHYSICAL INSTEAD OF DR. MUSA DUE TO LIMITED AVAILABILITY

## 2021-06-14 NOTE — TELEPHONE ENCOUNTER
Caller: Chen Martin    Relationship: Self    Best call back number: [phone]    What orders are you requesting (i.e. lab or imaging): LABS    In what timeframe would the patient need to come in: ONE WEEK BEFORE 07/13/2021    Where will you receive your lab/imaging services: [facility]    Additional notes: PATIENT CALLING TO REQUEST ORDERS FOR LABS AS SHE HAS A PHYSICAL SCHEDULED ON 07/13/2021. CALLBACK REQUESTED FOR SCHEDULING

## 2021-06-15 DIAGNOSIS — Z00.00 ENCOUNTER FOR ANNUAL PHYSICAL EXAM: Primary | ICD-10-CM

## 2021-06-15 DIAGNOSIS — Z11.59 NEED FOR HEPATITIS C SCREENING TEST: ICD-10-CM

## 2021-06-15 DIAGNOSIS — Z13.220 NEED FOR LIPID SCREENING: ICD-10-CM

## 2021-06-17 LAB
ALBUMIN SERPL-MCNC: 4.5 G/DL (ref 3.5–5.2)
ALBUMIN/GLOB SERPL: 2.3 G/DL
ALP SERPL-CCNC: 95 U/L (ref 39–117)
ALT SERPL-CCNC: 26 U/L (ref 1–33)
AST SERPL-CCNC: 15 U/L (ref 1–32)
BASOPHILS # BLD AUTO: 0.03 10*3/MM3 (ref 0–0.2)
BASOPHILS NFR BLD AUTO: 0.4 % (ref 0–1.5)
BILIRUB SERPL-MCNC: 0.5 MG/DL (ref 0–1.2)
BUN SERPL-MCNC: 8 MG/DL (ref 6–20)
BUN/CREAT SERPL: 12.5 (ref 7–25)
CALCIUM SERPL-MCNC: 9.4 MG/DL (ref 8.6–10.5)
CHLORIDE SERPL-SCNC: 102 MMOL/L (ref 98–107)
CHOLEST SERPL-MCNC: 223 MG/DL (ref 0–200)
CO2 SERPL-SCNC: 25 MMOL/L (ref 22–29)
CREAT SERPL-MCNC: 0.64 MG/DL (ref 0.57–1)
EOSINOPHIL # BLD AUTO: 0.27 10*3/MM3 (ref 0–0.4)
EOSINOPHIL NFR BLD AUTO: 3.2 % (ref 0.3–6.2)
ERYTHROCYTE [DISTWIDTH] IN BLOOD BY AUTOMATED COUNT: 11.8 % (ref 12.3–15.4)
GLOBULIN SER CALC-MCNC: 2 GM/DL
GLUCOSE SERPL-MCNC: 97 MG/DL (ref 65–99)
HCT VFR BLD AUTO: 39.9 % (ref 34–46.6)
HCV AB S/CO SERPL IA: <0.1 S/CO RATIO (ref 0–0.9)
HDLC SERPL-MCNC: 50 MG/DL (ref 40–60)
HGB BLD-MCNC: 13.2 G/DL (ref 12–15.9)
IMM GRANULOCYTES # BLD AUTO: 0.03 10*3/MM3 (ref 0–0.05)
IMM GRANULOCYTES NFR BLD AUTO: 0.4 % (ref 0–0.5)
LDLC SERPL CALC-MCNC: 146 MG/DL (ref 0–100)
LDLC/HDLC SERPL: 2.86 {RATIO}
LYMPHOCYTES # BLD AUTO: 2.18 10*3/MM3 (ref 0.7–3.1)
LYMPHOCYTES NFR BLD AUTO: 25.7 % (ref 19.6–45.3)
MCH RBC QN AUTO: 32.4 PG (ref 26.6–33)
MCHC RBC AUTO-ENTMCNC: 33.1 G/DL (ref 31.5–35.7)
MCV RBC AUTO: 98 FL (ref 79–97)
MONOCYTES # BLD AUTO: 0.68 10*3/MM3 (ref 0.1–0.9)
MONOCYTES NFR BLD AUTO: 8 % (ref 5–12)
NEUTROPHILS # BLD AUTO: 5.3 10*3/MM3 (ref 1.7–7)
NEUTROPHILS NFR BLD AUTO: 62.3 % (ref 42.7–76)
NRBC BLD AUTO-RTO: 0 /100 WBC (ref 0–0.2)
PLATELET # BLD AUTO: 325 10*3/MM3 (ref 140–450)
POTASSIUM SERPL-SCNC: 4.3 MMOL/L (ref 3.5–5.2)
PROT SERPL-MCNC: 6.5 G/DL (ref 6–8.5)
RBC # BLD AUTO: 4.07 10*6/MM3 (ref 3.77–5.28)
SODIUM SERPL-SCNC: 137 MMOL/L (ref 136–145)
TRIGL SERPL-MCNC: 149 MG/DL (ref 0–150)
VLDLC SERPL CALC-MCNC: 27 MG/DL (ref 5–40)
WBC # BLD AUTO: 8.49 10*3/MM3 (ref 3.4–10.8)

## 2021-06-21 ENCOUNTER — OFFICE VISIT (OUTPATIENT)
Dept: FAMILY MEDICINE CLINIC | Facility: CLINIC | Age: 46
End: 2021-06-21

## 2021-06-21 VITALS
WEIGHT: 224 LBS | BODY MASS INDEX: 37.32 KG/M2 | TEMPERATURE: 98.4 F | SYSTOLIC BLOOD PRESSURE: 120 MMHG | DIASTOLIC BLOOD PRESSURE: 80 MMHG | OXYGEN SATURATION: 97 % | HEART RATE: 77 BPM | HEIGHT: 65 IN

## 2021-06-21 DIAGNOSIS — E66.9 OBESITY (BMI 30-39.9): ICD-10-CM

## 2021-06-21 DIAGNOSIS — Z00.00 ENCOUNTER FOR ANNUAL PHYSICAL EXAM: Primary | ICD-10-CM

## 2021-06-21 DIAGNOSIS — E78.00 HYPERCHOLESTEREMIA: ICD-10-CM

## 2021-06-21 DIAGNOSIS — L91.8 INFLAMED SKIN TAG: ICD-10-CM

## 2021-06-21 PROCEDURE — 11200 RMVL SKIN TAGS UP TO&INC 15: CPT | Performed by: PHYSICIAN ASSISTANT

## 2021-06-21 PROCEDURE — 99396 PREV VISIT EST AGE 40-64: CPT | Performed by: PHYSICIAN ASSISTANT

## 2021-06-21 NOTE — PATIENT INSTRUCTIONS
Mediterranean Diet  A Mediterranean diet refers to food and lifestyle choices that are based on the traditions of countries located on the [place]. This way of eating has been shown to help prevent certain conditions and improve outcomes for people who have chronic diseases, like kidney disease and heart disease.  What are tips for following this plan?  Lifestyle  · Cook and eat meals together with your family, when possible.  · Drink enough fluid to keep your urine clear or pale yellow.  · Be physically active every day. This includes:  ? Aerobic exercise like running or swimming.  ? Leisure activities like gardening, walking, or housework.  · Get 7-8 hours of sleep each night.  · If recommended by your health care provider, drink red wine in moderation. This means 1 glass a day for nonpregnant women and 2 glasses a day for men. A glass of wine equals 5 oz (150 mL).  Reading food labels    · Check the serving size of packaged foods. For foods such as rice and pasta, the serving size refers to the amount of cooked product, not dry.  · Check the total fat in packaged foods. Avoid foods that have saturated fat or trans fats.  · Check the ingredients list for added sugars, such as corn syrup.  Shopping  · At the grocery store, buy most of your food from the areas near the walls of the store. This includes:  ? Fresh fruits and vegetables (produce).  ? Grains, beans, nuts, and seeds. Some of these may be available in unpackaged forms or large amounts (in bulk).  ? Fresh seafood.  ? Poultry and eggs.  ? Low-fat dairy products.  · Buy whole ingredients instead of prepackaged foods.  · Buy fresh fruits and vegetables in-season from local farmers markets.  · Buy frozen fruits and vegetables in resealable bags.  · If you do not have access to quality fresh seafood, buy precooked frozen shrimp or canned fish, such as tuna, salmon, or sardines.  · Buy small amounts of raw or cooked vegetables, salads, or olives from  the deli or salad bar at your store.  · Stock your pantry so you always have certain foods on hand, such as olive oil, canned tuna, canned tomatoes, rice, pasta, and beans.  Cooking  · Cook foods with extra-virgin olive oil instead of using butter or other vegetable oils.  · Have meat as a side dish, and have vegetables or grains as your main dish. This means having meat in small portions or adding small amounts of meat to foods like pasta or stew.  · Use beans or vegetables instead of meat in common dishes like chili or lasagna.  · [place] with different cooking methods. Try roasting or broiling vegetables instead of steaming or sautéeing them.  · Add frozen vegetables to soups, stews, pasta, or rice.  · Add nuts or seeds for added healthy fat at each meal. You can add these to yogurt, salads, or vegetable dishes.  · Marinate fish or vegetables using olive oil, lemon juice, garlic, and fresh herbs.  Meal planning    · Plan to eat 1 vegetarian meal one day each week. Try to work up to 2 vegetarian meals, if possible.  · Eat seafood 2 or more times a week.  · Have healthy snacks readily available, such as:  ? Vegetable sticks with hummus.  ? Greek yogurt.  ? Fruit and nut trail mix.  · Eat balanced meals throughout the week. This includes:  ? Fruit: 2-3 servings a day  ? Vegetables: 4-5 servings a day  ? Low-fat dairy: 2 servings a day  ? Fish, poultry, or lean meat: 1 serving a day  ? Beans and legumes: 2 or more servings a week  ? Nuts and seeds: 1-2 servings a day  ? Whole grains: 6-8 servings a day  ? Extra-virgin olive oil: 3-4 servings a day  · Limit red meat and sweets to only a few servings a month  What are my food choices?  · Mediterranean diet  ? Recommended  § Grains: Whole-grain pasta. Brown rice. Bulgar wheat. Polenta. Couscous. Whole-wheat bread. Oatmeal. Quinoa.  § Vegetables: Artichokes. Beets. Broccoli. Cabbage. Carrots. Eggplant. Green beans. Chard. Kale. Spinach. Onions. Leeks. Peas. Squash.  Tomatoes. Peppers. Radishes.  § Fruits: Apples. Apricots. Avocado. Berries. Bananas. Cherries. Dates. Figs. Grapes. [place]. Melon. Oranges. Peaches. Plums. Pomegranate.  § Meats and other protein foods: Beans. Almonds. Sunflower seeds. Pine nuts. Peanuts. Cod. [place]. Scallops. Shrimp. Tuna. Tilapia. Clams. Oysters. Eggs.  § Dairy: Low-fat milk. Cheese. Greek yogurt.  § Beverages: Water. Red wine. Herbal tea.  § Fats and oils: Extra virgin olive oil. Avocado oil. Grape seed oil.  § Sweets and desserts: Greek yogurt with honey. Baked apples. Poached pears. Trail mix.  § Seasoning and other foods: Basil. Cilantro. Coriander. Cumin. Mint. Parsley. [place]. Rosemary. Tarragon. Garlic. Oregano. Thyme. Pepper. Balsalmic vinegar. Tahini. Hummus. Tomato sauce. Olives. Mushrooms.  ? Limit these  § Grains: Prepackaged pasta or rice dishes. Prepackaged cereal with added sugar.  § Vegetables: Deep fried potatoes (french fries).  § Fruits: Fruit canned in syrup.  § Meats and other protein foods: Beef. Pork. Lamb. Poultry with skin. Hot dogs. [place].  § Dairy: Ice cream. Sour cream. Whole milk.  § Beverages: Juice. Sugar-sweetened soft drinks. Beer. Liquor and spirits.  § Fats and oils: Butter. Canola oil. Vegetable oil. Beef fat (tallow). Lard.  § Sweets and desserts: Cookies. Cakes. Pies. Candy.  § Seasoning and other foods: Mayonnaise. Premade sauces and marinades.  The items listed may not be a complete list. Talk with your dietitian about what dietary choices are right for you.  Summary  · The Mediterranean diet includes both food and lifestyle choices.  · Eat a variety of fresh fruits and vegetables, beans, nuts, seeds, and whole grains.  · Limit the amount of red meat and sweets that you eat.  · Talk with your health care provider about whether it is safe for you to drink red wine in moderation. This means 1 glass a day for nonpregnant women and 2 glasses a day for men. A glass of wine equals 5 oz (150 mL).  This information  is not intended to replace advice given to you by your health care provider. Make sure you discuss any questions you have with your health care provider.  Document Revised: 08/17/2017 Document Reviewed: 08/10/2017  Elsevier Patient Education © 2020 Elsevier Inc.

## 2021-06-21 NOTE — PROGRESS NOTES
"Chief Complaint  Annual Exam    Subjective          Chen Martin presents to [facility]  History of Present Illness    Chen is a 45-year-old female who presents for annual physical exam.  She has gained 9 pounds since January 6,2021.  Chen currently sees Dr. Cruz for her Pap smears.  Bowel movements have been normal without dark black tarry stools.  She has been exercising at least 3-4 times weekly by doing aerobics and swimming in the pool.  Diet has been slightly healthy.  Sleep has been normal.  Denied any chest pain, shortness of air, dizziness, vision changes, fevers, chills, upper respiratory symptoms, GI upset or urinary symptoms.  Chen has an irritated skin tag underneath her right armpit.  States it gets irritated and bleeds especially after shaving.     Objective   Vital Signs:   /80   Pulse 77   Temp 98.4 °F (36.9 °C)   Ht 165.1 cm (65\")   Wt 102 kg (224 lb)   SpO2 97%   BMI 37.28 kg/m²     Physical Exam  Vitals and nursing note reviewed.   Constitutional:       Appearance: Normal appearance. She is well-developed and well-groomed. She is obese.      Interventions: Face mask in place.   HENT:      Head: Normocephalic and atraumatic.      Jaw: There is normal jaw occlusion.      Right Ear: Hearing, tympanic membrane, ear canal and external ear normal.      Left Ear: Hearing, tympanic membrane, ear canal and external ear normal.      Nose: Nose normal.      Right Sinus: No maxillary sinus tenderness or frontal sinus tenderness.      Left Sinus: No maxillary sinus tenderness or frontal sinus tenderness.      Mouth/Throat:      Lips: Pink.      Mouth: Mucous membranes are moist.      Dentition: Normal dentition.      Tongue: No lesions. Tongue does not deviate from midline.      Palate: No mass and lesions.      Pharynx: Oropharynx is clear. Uvula midline.      Tonsils: No tonsillar exudate.   Eyes:      General: Lids are normal.      " Conjunctiva/sclera: Conjunctivae normal.      Pupils: Pupils are equal, round, and reactive to light.   Neck:      Thyroid: No thyroid mass, thyromegaly or thyroid tenderness.      Trachea: Trachea and phonation normal. No tracheal tenderness.   Cardiovascular:      Rate and Rhythm: Normal rate and regular rhythm.      Pulses: Normal pulses.      Heart sounds: Normal heart sounds, S1 normal and S2 normal. No murmur heard.     Pulmonary:      Effort: Pulmonary effort is normal.      Breath sounds: Normal breath sounds and air entry.   Abdominal:      General: Bowel sounds are normal.      Palpations: Abdomen is soft.      Tenderness: There is no abdominal tenderness. There is no right CVA tenderness, left CVA tenderness, guarding or rebound. Negative signs include Carrion's sign, Rovsing's sign, McBurney's sign, psoas sign and obturator sign.   Musculoskeletal:      Cervical back: Neck supple.      Right lower leg: No edema.      Left lower leg: No edema.   Lymphadenopathy:      Cervical: No cervical adenopathy.      Right cervical: No superficial, deep or posterior cervical adenopathy.     Left cervical: No superficial, deep or posterior cervical adenopathy.   Skin:     General: Skin is warm and dry.      Capillary Refill: Capillary refill takes less than 2 seconds.      Findings: Erythema and lesion present.          Neurological:      Mental Status: She is alert and oriented to person, place, and time.      Deep Tendon Reflexes:      Reflex Scores:       Patellar reflexes are 2+ on the right side and 2+ on the left side.  Psychiatric:         Attention and Perception: Attention and perception normal.         Mood and Affect: Mood and affect normal.         Speech: Speech normal.         Behavior: Behavior is cooperative.         Thought Content: Thought content normal.         Cognition and Memory: Cognition and memory normal.         Judgment: Judgment normal.     I was wearing a surgical mask and face shield  during the entire office visit/encounter.    Patient's (Body mass index is 37.28 kg/m².) indicates that they are obese (BMI >30) with obesity-related health conditions that include none . Obesity is worsening. BMI is is above average; BMI management plan is completed. We discussed low calorie, low carb based diet program, portion control, increasing exercise, joining a fitness center or start home based exercise program, Weight Watchers or other Commercial based weight reduction program and an janet-based approach such as BlueConic Pal or Lose It.      Result Review :     CMP    CMP 6/16/21   Glucose 97   BUN 8   Creatinine 0.64   eGFR Non  Am 101   eGFR African Am 122   Sodium 137   Potassium 4.3   Chloride 102   Calcium 9.4   Total Protein 6.5   Albumin 4.50   Globulin 2.0   Total Bilirubin 0.5   Alkaline Phosphatase 95   AST (SGOT) 15   ALT (SGPT) 26      Comments are available for some flowsheets but are not being displayed.           CBC w/diff    CBC w/Diff 6/16/21   WBC 8.49   RBC 4.07   Hemoglobin 13.2   Hematocrit 39.9   MCV 98.0 (A)   MCH 32.4   MCHC 33.1   RDW 11.8 (A)   Platelets 325   Neutrophil Rel % 62.3   Lymphocyte Rel % 25.7   Monocyte Rel % 8.0   Eosinophil Rel % 3.2   Basophil Rel % 0.4   (A) Abnormal value            Lipid Panel    Lipid Panel 6/16/21   Total Cholesterol 223 (A)   Triglycerides 149   HDL Cholesterol 50   VLDL Cholesterol 27   LDL Cholesterol  146 (A)   LDL/HDL Ratio 2.86   (A) Abnormal value       Comments are available for some flowsheets but are not being displayed.                  Skin Tag Removal    Date/Time: 6/21/2021 2:00 PM  Performed by: Edyta Schmid PA-C  Authorized by: Edyta Schmid PA-C   Preparation: Patient was prepped and draped in the usual sterile fashion.  Local anesthesia used: no    Anesthesia:  Local anesthesia used: no    Sedation:  Patient sedated: no    Patient tolerance: patient tolerated the procedure well with no immediate  complications  Comments: One skin was noted on right axillae on erythematous base.  It is irritated.  Patient has given written consent for skin tag removal.  Area was cleaned with alcohol swab.  The skin tag was removed without complication by using forceps and scissors.  Ethyl chloride was applied for numbing.  Neosporin and Band-Aid was applied.  Instructed to keep the area clean and dry.            Assessment and Plan    Diagnoses and all orders for this visit:    1. Encounter for annual physical exam (Primary)    2. Obesity (BMI 30-39.9)    3. Hypercholesteremia    4. Inflamed skin tag  -     Skin Tag Removal    1.  Annual physical examination with obesity:  I have reviewed her above blood work with her at office visit today.  Stressed the importance of losing weight by dieting and exercising.  Have given her the Mediterranean diet to follow.  Will reevaluate weight at next office visit in 1 year.  2.  New hypercholesteremia: I have reviewed above blood work with her at office visit today.  Cholesterol was slightly elevated.  Stressed importance of decreasing her fatty food intake and increase physical activity.  I will recheck fasting lab work in 1 year.  3.  New inflamed skin tag of right axillae: Skin tag was removed without difficulty.  Instructed to keep the care area clean and dry.      Follow Up   Return in about 1 year (around 6/21/2022) for Annual.  Patient was given instructions and counseling regarding her condition or for health maintenance advice. Please see specific information pulled into the AVS if appropriate she has      Patient Counseling:  --Nutrition: Stressed importance of moderation in sodium/caffeine intake, saturated fat and cholesterol.  Discussed caloric balance, sufficient intake of fresh fruits, vegetables, fiber,   calcium, iron.  --Discussed the new recommendation against daily use of baby aspirin for primary prevention in low risk patients.  --Exercise: Stressed the importance of  regular exercise by incorporating into daily routine.    --Substance Abuse: Discussed cessation/primary prevention of tobacco, alcohol, or other drug use; driving or other dangerous activities under the influence.    --Dental health: Discussed importance of regular tooth brushing, flossing, and dental visits.  -- Suggested having eyes and vision checked if needed or past due.  --Immunizations reviewed.  --Discussed benefits of screening colonoscopy.      KENDELL Brennan [facility]  [address] [place] [id number]  Dept: [phone]  Dept Fax: [phone]  Loc: [phone]  Loc Fax: [phone]

## 2021-10-21 RX ORDER — DICLOFENAC SODIUM 75 MG/1
75 TABLET, DELAYED RELEASE ORAL 2 TIMES DAILY
Qty: 60 TABLET | Refills: 3 | Status: SHIPPED | OUTPATIENT
Start: 2021-10-21 | End: 2021-11-20

## 2021-12-22 ENCOUNTER — TELEPHONE (OUTPATIENT)
Dept: FAMILY MEDICINE CLINIC | Facility: CLINIC | Age: 46
End: 2021-12-22

## 2021-12-22 DIAGNOSIS — R42 VERTIGO: Primary | ICD-10-CM

## 2021-12-22 RX ORDER — MECLIZINE HYDROCHLORIDE 25 MG/1
25 TABLET ORAL 3 TIMES DAILY PRN
Qty: 30 TABLET | Refills: 0 | Status: SHIPPED | OUTPATIENT
Start: 2021-12-22

## 2021-12-22 NOTE — TELEPHONE ENCOUNTER
Notify patient I have sent Antivert to pharmacy to use for her vertigo.  May need to see chiropractor for Epley maneuver adjustments to help with this.  I will try to attach Epley maneuvers to her electronic medical chart for her review.  If symptoms do not improve, recommend going to the urgent care.

## 2021-12-22 NOTE — TELEPHONE ENCOUNTER
Caller: Chen Martin    Relationship: Self    Best call back number: [phone]    What medication are you requesting: MECLIZINE    What are your current symptoms: EXTREME VERTIGO UNABLE TO SIT OR STAND WITH OUT THROWING UP    How long have you been experiencing symptoms: 2AM, THEN AGAIN AT 6:30 WHEN TRYING TO GET OUT OF BED TO USE RESTROOM.    Have you had these symptoms before:    [x] Yes  [] No    Have you been treated for these symptoms before:   [x] Yes  [] No    If a prescription is needed, what is your preferred pharmacy and phone number: [facility]Y #[id number] - [facility] - [address] [address] AT [facility] [address] - [phone] ph - [phone]      Additional notes:  PATIENT IS REQUESTING SOMETHING TO HELP HER BE ABLE TO GET UP, SIT UP, AND THE ROOM TO QUIT SPINNING.

## 2022-06-15 DIAGNOSIS — Z13.220 NEED FOR LIPID SCREENING: ICD-10-CM

## 2022-06-15 DIAGNOSIS — Z00.00 ENCOUNTER FOR ANNUAL PHYSICAL EXAM: Primary | ICD-10-CM

## 2022-06-15 DIAGNOSIS — E78.00 HYPERCHOLESTEREMIA: ICD-10-CM

## 2022-06-21 LAB
ALBUMIN SERPL-MCNC: 4.4 G/DL (ref 3.8–4.8)
ALBUMIN/GLOB SERPL: 1.8 {RATIO} (ref 1.2–2.2)
ALP SERPL-CCNC: 89 IU/L (ref 44–121)
ALT SERPL-CCNC: 22 IU/L (ref 0–32)
AST SERPL-CCNC: 16 IU/L (ref 0–40)
BASOPHILS # BLD AUTO: 0 X10E3/UL (ref 0–0.2)
BASOPHILS NFR BLD AUTO: 0 %
BILIRUB SERPL-MCNC: 0.5 MG/DL (ref 0–1.2)
BUN SERPL-MCNC: 11 MG/DL (ref 6–24)
BUN/CREAT SERPL: 15 (ref 9–23)
CALCIUM SERPL-MCNC: 9.3 MG/DL (ref 8.7–10.2)
CHLORIDE SERPL-SCNC: 104 MMOL/L (ref 96–106)
CHOLEST SERPL-MCNC: 220 MG/DL (ref 100–199)
CO2 SERPL-SCNC: 23 MMOL/L (ref 20–29)
CREAT SERPL-MCNC: 0.71 MG/DL (ref 0.57–1)
EGFRCR SERPLBLD CKD-EPI 2021: 107 ML/MIN/1.73
EOSINOPHIL # BLD AUTO: 0.2 X10E3/UL (ref 0–0.4)
EOSINOPHIL NFR BLD AUTO: 2 %
ERYTHROCYTE [DISTWIDTH] IN BLOOD BY AUTOMATED COUNT: 11.6 % (ref 11.7–15.4)
GLOBULIN SER CALC-MCNC: 2.4 G/DL (ref 1.5–4.5)
GLUCOSE SERPL-MCNC: 97 MG/DL (ref 65–99)
HCT VFR BLD AUTO: 39.5 % (ref 34–46.6)
HDLC SERPL-MCNC: 54 MG/DL
HGB BLD-MCNC: 13.2 G/DL (ref 11.1–15.9)
IMM GRANULOCYTES # BLD AUTO: 0 X10E3/UL (ref 0–0.1)
IMM GRANULOCYTES NFR BLD AUTO: 0 %
LDLC SERPL CALC-MCNC: 151 MG/DL (ref 0–99)
LDLC/HDLC SERPL: 2.8 RATIO (ref 0–3.2)
LYMPHOCYTES # BLD AUTO: 2.4 X10E3/UL (ref 0.7–3.1)
LYMPHOCYTES NFR BLD AUTO: 32 %
MCH RBC QN AUTO: 32.6 PG (ref 26.6–33)
MCHC RBC AUTO-ENTMCNC: 33.4 G/DL (ref 31.5–35.7)
MCV RBC AUTO: 98 FL (ref 79–97)
MONOCYTES # BLD AUTO: 0.6 X10E3/UL (ref 0.1–0.9)
MONOCYTES NFR BLD AUTO: 9 %
NEUTROPHILS # BLD AUTO: 4.2 X10E3/UL (ref 1.4–7)
NEUTROPHILS NFR BLD AUTO: 57 %
PLATELET # BLD AUTO: 317 X10E3/UL (ref 150–450)
POTASSIUM SERPL-SCNC: 4.3 MMOL/L (ref 3.5–5.2)
PROT SERPL-MCNC: 6.8 G/DL (ref 6–8.5)
RBC # BLD AUTO: 4.05 X10E6/UL (ref 3.77–5.28)
SODIUM SERPL-SCNC: 143 MMOL/L (ref 134–144)
TRIGL SERPL-MCNC: 85 MG/DL (ref 0–149)
VLDLC SERPL CALC-MCNC: 15 MG/DL (ref 5–40)
WBC # BLD AUTO: 7.4 X10E3/UL (ref 3.4–10.8)

## 2022-06-29 ENCOUNTER — TELEPHONE (OUTPATIENT)
Dept: FAMILY MEDICINE CLINIC | Facility: CLINIC | Age: 47
End: 2022-06-29

## 2022-06-29 NOTE — TELEPHONE ENCOUNTER
Please call patient with below test results.  She was a no show for today's appointment.  1.  CBC was normal  2.  Fasting blood sugar was 97 which was normal  3.  Cholesterol was 220,triglyerides 85,HDl 54 and LDL was 151.  Cholesterol reading were slightly elevated.  Decrease fatty food in diet and increase physical activity.

## 2022-07-06 ENCOUNTER — OFFICE VISIT (OUTPATIENT)
Dept: FAMILY MEDICINE CLINIC | Facility: CLINIC | Age: 47
End: 2022-07-06

## 2022-07-06 VITALS
OXYGEN SATURATION: 98 % | WEIGHT: 202 LBS | DIASTOLIC BLOOD PRESSURE: 70 MMHG | HEART RATE: 82 BPM | SYSTOLIC BLOOD PRESSURE: 120 MMHG | TEMPERATURE: 98.6 F | HEIGHT: 65 IN | BODY MASS INDEX: 33.66 KG/M2

## 2022-07-06 DIAGNOSIS — Z00.00 ENCOUNTER FOR ANNUAL PHYSICAL EXAM: Primary | ICD-10-CM

## 2022-07-06 DIAGNOSIS — E78.00 HYPERCHOLESTEREMIA: ICD-10-CM

## 2022-07-06 PROCEDURE — 99396 PREV VISIT EST AGE 40-64: CPT | Performed by: PHYSICIAN ASSISTANT

## 2022-07-06 RX ORDER — DICLOFENAC SODIUM 75 MG/1
TABLET, DELAYED RELEASE ORAL
COMMUNITY
Start: 2022-06-26 | End: 2022-09-12 | Stop reason: SDUPTHER

## 2022-07-06 NOTE — PROGRESS NOTES
"Chief Complaint  Annual Exam    Subjective          Chen Martin presents to [facility]  History of Present Illness  Chen is a 46-year-old female who presents for annual physical examination with hypercholesteremia.  States overall she is feeling well.  Has lost 22 pounds since last office visit by dieting and exercising.  She has cut back on fatty foods and carbohydrates.  Denied any fevers, chills, upper respiratory symptoms, chest pain, shortness of air, cough, wheezing, abdominal pain, or urinary symptoms.  Currently sees gynecologist for Pap smears.  Bowel movements have been daily without dark black tarry stools.  Review of Systems   All other systems reviewed and are negative.    Objective   Vital Signs:   /70 (BP Location: Left arm, Patient Position: Sitting, Cuff Size: Adult)   Pulse 82   Temp 98.6 °F (37 °C)   Ht 165.1 cm (65\")   Wt 91.6 kg (202 lb)   SpO2 98%   BMI 33.61 kg/m²     Physical Exam  Vitals and nursing note reviewed.   Constitutional:       Appearance: Normal appearance. She is well-developed and well-groomed. She is obese.      Interventions: Face mask in place.   HENT:      Head: Normocephalic and atraumatic.      Jaw: There is normal jaw occlusion.      Right Ear: Hearing, tympanic membrane, ear canal and external ear normal.      Left Ear: Hearing, tympanic membrane, ear canal and external ear normal.      Nose: Nose normal.      Right Sinus: No maxillary sinus tenderness or frontal sinus tenderness.      Left Sinus: No maxillary sinus tenderness or frontal sinus tenderness.      Mouth/Throat:      Lips: Pink.      Mouth: Mucous membranes are moist.      Dentition: Normal dentition.      Tongue: No lesions.      Pharynx: Oropharynx is clear. Uvula midline.      Tonsils: No tonsillar exudate.   Eyes:      General: Lids are normal.      Conjunctiva/sclera: Conjunctivae normal.      Pupils: Pupils are equal, round, and reactive to light. "   Neck:      Thyroid: No thyromegaly.      Vascular: No carotid bruit.      Trachea: Trachea and phonation normal. No tracheal tenderness.   Cardiovascular:      Rate and Rhythm: Normal rate and regular rhythm.      Pulses: Normal pulses.      Heart sounds: Normal heart sounds, S1 normal and S2 normal. No murmur heard.  Pulmonary:      Effort: Pulmonary effort is normal.      Breath sounds: Normal breath sounds and air entry.   Abdominal:      General: Bowel sounds are normal.      Palpations: Abdomen is soft.      Tenderness: There is no abdominal tenderness. There is no right CVA tenderness, left CVA tenderness, guarding or rebound. Negative signs include Carrion's sign, Rovsing's sign, McBurney's sign, psoas sign and obturator sign.   Musculoskeletal:      Cervical back: Neck supple.      Right lower leg: No edema.      Left lower leg: No edema.   Lymphadenopathy:      Cervical: No cervical adenopathy.      Right cervical: No superficial, deep or posterior cervical adenopathy.     Left cervical: No superficial, deep or posterior cervical adenopathy.   Skin:     General: Skin is warm and dry.      Capillary Refill: Capillary refill takes less than 2 seconds.   Neurological:      Mental Status: She is alert and oriented to person, place, and time.      Deep Tendon Reflexes:      Reflex Scores:       Patellar reflexes are 2+ on the right side and 2+ on the left side.  Psychiatric:         Attention and Perception: Attention and perception normal.         Mood and Affect: Mood and affect normal.         Speech: Speech normal.         Behavior: Behavior is cooperative.         Thought Content: Thought content normal.         Cognition and Memory: Cognition and memory normal.         Judgment: Judgment normal.     I wore a facial mask, face shield, and gloves during this patient encounter.  Patient also wearing a surgical mask.  Hand hygiene performed before and after seeing the patient.     Result Review :     CMP     CMP 6/20/22   Glucose 97   BUN 11   Creatinine 0.71   Sodium 143   Potassium 4.3   Chloride 104   Calcium 9.3   Total Protein 6.8   Albumin 4.4   Globulin 2.4   Total Bilirubin 0.5   Alkaline Phosphatase 89   AST (SGOT) 16   ALT (SGPT) 22           CBC w/diff    CBC w/Diff 6/20/22   WBC 7.4   RBC 4.05   Hemoglobin 13.2   Hematocrit 39.5   MCV 98 (A)   MCH 32.6   MCHC 33.4   RDW 11.6 (A)   Platelets 317   Neutrophil Rel % 57   Lymphocyte Rel % 32   Monocyte Rel % 9   Eosinophil Rel % 2   Basophil Rel % 0   (A) Abnormal value            Lipid Panel    Lipid Panel 6/20/22   Total Cholesterol 220 (A)   Triglycerides 85   HDL Cholesterol 54   VLDL Cholesterol 15   LDL Cholesterol  151 (A)   LDL/HDL Ratio 2.8   (A) Abnormal value       Comments are available for some flowsheets but are not being displayed.                         Assessment and Plan    Diagnoses and all orders for this visit:    1. Encounter for annual physical exam (Primary)    2. Hypercholesteremia  -     Lipid Panel With LDL / HDL Ratio; Future      Chen was seen in office today for annual physical examination with hypercholesteremia: I have reviewed above blood work with her at office visit today.  Stressed the importance of decreasing fatty food in diet and increasing physical activity.  We will try to increase her salmon, avocado and cinnamon intake.  Plan to recheck lipid profile in 6 months.  May consider starting medication in future if warranted.  Patient Counseling:  --Nutrition: Stressed importance of moderation in sodium/caffeine intake, saturated fat and cholesterol.  Discussed caloric balance, sufficient intake of fresh fruits, vegetables, fiber,   calcium, iron.  --Discussed the new recommendation against daily use of baby aspirin for primary prevention in low risk patients.  --Exercise: Stressed the importance of regular exercise by incorporating into daily routine.    --Substance Abuse: Discussed cessation/primary prevention of  tobacco, alcohol, or other drug use; driving or other dangerous activities under the influence.    --Dental health: Discussed importance of regular tooth brushing, flossing, and dental visits.  -- Suggested having eyes and vision checked if needed or past due.  --Immunizations reviewed.  --Discussed benefits of screening colonoscopy.        Follow Up   Return in about 6 months (around 1/6/2023), or chol-labs only Physical in 1 year.  Patient was given instructions and counseling regarding her condition or for health maintenance advice. Please see specific information pulled into the AVS if appropriate.     KENDELL Brennan [facility]  [address] [place] [id number]  Dept: [phone]  Dept Fax: [phone]  Loc: [phone]  Loc Fax: [phone]

## 2022-09-11 ENCOUNTER — DOCUMENTATION (OUTPATIENT)
Dept: ORTHOPEDIC SURGERY | Facility: CLINIC | Age: 47
End: 2022-09-11

## 2022-09-12 RX ORDER — DICLOFENAC SODIUM 75 MG/1
75 TABLET, DELAYED RELEASE ORAL 2 TIMES DAILY
Qty: 60 TABLET | Refills: 3 | Status: SHIPPED | OUTPATIENT
Start: 2022-09-12

## 2022-11-28 RX ORDER — METHYLPREDNISOLONE 4 MG/1
TABLET ORAL
Qty: 21 TABLET | Refills: 0 | Status: SHIPPED | OUTPATIENT
Start: 2022-11-28

## 2022-11-28 RX ORDER — CYCLOBENZAPRINE HCL 5 MG
5 TABLET ORAL NIGHTLY PRN
Qty: 45 TABLET | Refills: 0 | Status: SHIPPED | OUTPATIENT
Start: 2022-11-28

## 2023-02-07 RX ORDER — ONDANSETRON 4 MG/1
4 TABLET, ORALLY DISINTEGRATING ORAL EVERY 8 HOURS PRN
Qty: 15 TABLET | Refills: 0 | Status: SHIPPED | OUTPATIENT
Start: 2023-02-07

## 2023-03-27 ENCOUNTER — DOCUMENTATION (OUTPATIENT)
Dept: ORTHOPEDIC SURGERY | Facility: CLINIC | Age: 48
End: 2023-03-27
Payer: COMMERCIAL

## 2023-03-27 RX ORDER — NITROFURANTOIN 25; 75 MG/1; MG/1
100 CAPSULE ORAL 2 TIMES DAILY
Qty: 14 CAPSULE | Refills: 0 | Status: SHIPPED | OUTPATIENT
Start: 2023-03-27 | End: 2023-04-03

## 2023-05-12 RX ORDER — DICLOFENAC SODIUM 75 MG/1
TABLET, DELAYED RELEASE ORAL
Qty: 60 TABLET | Refills: 5 | Status: SHIPPED | OUTPATIENT
Start: 2023-05-12

## 2023-05-12 NOTE — TELEPHONE ENCOUNTER
Rx Refill Note  Requested Prescriptions     Pending Prescriptions Disp Refills    diclofenac (VOLTAREN) 75 MG EC tablet [Pharmacy Med Name: Diclofenac Sodium 75 MG Oral Tablet Delayed Release] 60 tablet 0     Sig: Take 1 tablet by mouth twice daily      Last office visit with prescribing clinician: Visit date not found      Next office visit with prescribing clinician: Visit date not found     Moderate left ankle sprain, initial encounter       Caridad Johns MA  05/12/23, 08:54 EDT    {TIP  Encounters:    {TIP  Please add Last Relevant Lab Date if appropriate:  {TIP  Is Refill Pharmacy correct?:

## 2023-07-10 PROBLEM — Z23 NEED FOR TETANUS, DIPHTHERIA, AND ACELLULAR PERTUSSIS (TDAP) VACCINE IN PATIENT OF ADOLESCENT AGE OR OLDER: Status: RESOLVED | Noted: 2019-06-14 | Resolved: 2023-07-10

## 2023-07-10 PROBLEM — E78.00 HYPERCHOLESTEREMIA: Status: ACTIVE | Noted: 2023-07-10

## 2023-07-11 ENCOUNTER — TELEPHONE (OUTPATIENT)
Dept: FAMILY MEDICINE CLINIC | Facility: CLINIC | Age: 48
End: 2023-07-11

## 2023-07-11 NOTE — TELEPHONE ENCOUNTER
Caller: Chen Martin    Relationship to patient: Self    Best call back number: [phone]    Patient is needing: PATIENT STATES THAT HER PHARMACY TOLD HER THE [facility] IS NEEDING TO DO A PRIOR AUTHORIZATION FOR THE Semaglutide-Weight Management 0.25 MG/0.5ML solution auto-injector .REQUESTING CALLBACK WITH UPDATES.    [facility] [address] [place] [address] [address] [place] [address] [address] - [phone]  - [phone]  [phone]

## 2023-07-12 NOTE — TELEPHONE ENCOUNTER
Please notify patient that the prior authorization is in progress.  We have not heard any thing from her insurance company as of today.

## 2023-07-24 DIAGNOSIS — E66.9 OBESITY (BMI 30.0-34.9): ICD-10-CM

## 2023-08-03 DIAGNOSIS — E66.9 OBESITY (BMI 30.0-34.9): Primary | ICD-10-CM

## 2023-08-03 RX ORDER — PEN NEEDLE, DIABETIC 32 GX 1/4"
NEEDLE, DISPOSABLE MISCELLANEOUS
Qty: 30 EACH | Refills: 3 | Status: SHIPPED | OUTPATIENT
Start: 2023-08-03

## 2023-08-21 ENCOUNTER — OFFICE VISIT (OUTPATIENT)
Dept: FAMILY MEDICINE CLINIC | Facility: CLINIC | Age: 48
End: 2023-08-21
Payer: COMMERCIAL

## 2023-08-21 VITALS
DIASTOLIC BLOOD PRESSURE: 82 MMHG | HEIGHT: 65 IN | SYSTOLIC BLOOD PRESSURE: 124 MMHG | WEIGHT: 219 LBS | HEART RATE: 90 BPM | RESPIRATION RATE: 15 BRPM | BODY MASS INDEX: 36.49 KG/M2 | OXYGEN SATURATION: 99 % | TEMPERATURE: 97.8 F

## 2023-08-21 DIAGNOSIS — E66.9 OBESITY (BMI 30-39.9): Primary | ICD-10-CM

## 2023-08-21 PROCEDURE — 99213 OFFICE O/P EST LOW 20 MIN: CPT | Performed by: PHYSICIAN ASSISTANT

## 2023-08-21 NOTE — PROGRESS NOTES
"Chief Complaint  Weight Check    Subjective          Chen Martin presents to [facility] PRIMARY [facility]ARE  History of Present Illness  Chen is a 47-year-old female who presents for obesity/weight management.  She has lost 5 pounds since July 10, 2023.  She has been taking Saxenda medicatiion daily for weight loss.  She is doing well with weight loss medication.  Has slight nausea but does not last long.  She has made dietary changes but decreasing portion sizes and calories.  Exercises 5 days weekly by walking and swimming.  Sleep has been normal.  Denied any chest pain, shortness of air, vomiting, diarrhea or wheezing.     Objective   Vital Signs:   /82 (BP Location: Left arm, Patient Position: Sitting, Cuff Size: Adult)   Pulse 90   Temp 97.8 øF (36.6 øC)   Resp 15   Ht 165.1 cm (65\")   Wt 99.3 kg (219 lb)   SpO2 99%   BMI 36.44 kg/mý     Physical Exam  Vitals and nursing note reviewed.   Constitutional:       Appearance: Normal appearance. She is well-developed and well-groomed. She is obese.   HENT:      Head: Normocephalic and atraumatic.   Neck:      Thyroid: No thyroid mass, thyromegaly or thyroid tenderness.      Trachea: Trachea and phonation normal. No tracheal tenderness.   Cardiovascular:      Rate and Rhythm: Normal rate and regular rhythm.      Pulses: Normal pulses.      Heart sounds: Normal heart sounds, S1 normal and S2 normal. No murmur heard.  Pulmonary:      Effort: Pulmonary effort is normal.      Breath sounds: Normal breath sounds and air entry.   Abdominal:      General: Bowel sounds are normal.      Palpations: Abdomen is soft. There is no hepatomegaly.      Tenderness: There is no abdominal tenderness. There is no right CVA tenderness, left CVA tenderness, guarding or rebound. Negative signs include Carrion's sign, Rovsing's sign, McBurney's sign, psoas sign and obturator sign.   Musculoskeletal:      Cervical back: Neck supple.      Right lower leg: No " edema.      Left lower leg: No edema.   Skin:     General: Skin is warm and dry.      Capillary Refill: Capillary refill takes less than 2 seconds.   Neurological:      Mental Status: She is alert and oriented to person, place, and time.   Psychiatric:         Attention and Perception: Attention and perception normal.         Mood and Affect: Mood and affect normal.         Speech: Speech normal.         Behavior: Behavior normal. Behavior is cooperative.         Thought Content: Thought content normal.         Cognition and Memory: Cognition and memory normal.         Judgment: Judgment normal.      Result Review :                 Assessment and Plan    Diagnoses and all orders for this visit:    1. Obesity (BMI 30-39.9) (Primary)  -     Liraglutide (SAXENDA) 18 MG/3ML injection pen; Inject 3 mg under the skin into the appropriate area as directed Daily.  Dispense: 9 mL; Refill: 0    Chen was seen in office today for chronic and improving obesity.  Doing well with the Saxenda medication.  I have sent Saxenda maintenance medication to pharmacy to use as directed.  She will return to office in December for reevaluation of weight.  She was given encouragement and praise for weight loss.    I spent 15 minutes caring for Chen on this date of service. This time includes time spent by me in the following activities:preparing for the visit, obtaining and/or reviewing a separately obtained history, performing a medically appropriate examination and/or evaluation , counseling and educating the patient/family/caregiver, ordering medications, tests, or procedures, and documenting information in the medical record  Follow Up   Return in about 4 months (around 12/21/2023), or weight.  Patient was given instructions and counseling regarding her condition or for health maintenance advice. Please see specific information pulled into the AVS if appropriate.     KENDELL Brennan [facility]  [facility]  [address] [address]  Dept: [phone]  Dept Fax: [phone]  Loc: [phone]  Loc Fax: [phone]

## 2023-09-15 DIAGNOSIS — E66.9 OBESITY (BMI 30-39.9): ICD-10-CM

## 2023-09-15 RX ORDER — LIRAGLUTIDE 6 MG/ML
INJECTION, SOLUTION SUBCUTANEOUS
Qty: 15 ML | Refills: 0 | Status: SHIPPED | OUTPATIENT
Start: 2023-09-15

## 2023-10-23 DIAGNOSIS — E66.9 OBESITY (BMI 30-39.9): Primary | ICD-10-CM

## 2023-10-23 RX ORDER — SEMAGLUTIDE 2.4 MG/.75ML
2.4 INJECTION, SOLUTION SUBCUTANEOUS WEEKLY
Qty: 3 ML | Refills: 2 | Status: SHIPPED | OUTPATIENT
Start: 2023-10-23

## 2023-12-07 RX ORDER — CYCLOBENZAPRINE HCL 10 MG
10 TABLET ORAL NIGHTLY PRN
Qty: 30 TABLET | Refills: 0 | Status: SHIPPED | OUTPATIENT
Start: 2023-12-07

## 2023-12-20 ENCOUNTER — OFFICE VISIT (OUTPATIENT)
Dept: FAMILY MEDICINE CLINIC | Facility: CLINIC | Age: 48
End: 2023-12-20
Payer: COMMERCIAL

## 2023-12-20 VITALS
HEIGHT: 65 IN | RESPIRATION RATE: 14 BRPM | OXYGEN SATURATION: 98 % | HEART RATE: 74 BPM | WEIGHT: 199.8 LBS | DIASTOLIC BLOOD PRESSURE: 78 MMHG | TEMPERATURE: 98.8 F | BODY MASS INDEX: 33.29 KG/M2 | SYSTOLIC BLOOD PRESSURE: 120 MMHG

## 2023-12-20 DIAGNOSIS — E66.9 OBESITY (BMI 30-39.9): Primary | ICD-10-CM

## 2023-12-20 PROCEDURE — 99212 OFFICE O/P EST SF 10 MIN: CPT | Performed by: PHYSICIAN ASSISTANT

## 2023-12-20 NOTE — PROGRESS NOTES
"Chief Complaint  Obesity (management)    Subjective          Chen Martin presents to [facility]  History of Present Illness  Chen is a 48 year old female who presents for obesity and weight management.  She has been taking the Wegovy 2.4 mg once weekly.  She has 15 pounds since July 2023.  Doing well with the medication.  She has been seeing her weight management  through her insurance.  States this has been helping with her weight loss journey.  She has been trying to use her elliptical and her bike 3 times weekly.  Appetite is decreased portion sizes and carbohydrates/calories.  Sleep has been normal.  Denied any GI upset, chest pain, shortness of air, cough, wheezing, hair loss or swelling of ankles.  States the medication has helped greatly for her weight loss journey.  Bowel movements have been regular without dark black tarry stools.     Objective   Vital Signs:   /78 (BP Location: Left arm, Patient Position: Sitting, Cuff Size: Adult)   Pulse 74   Temp 98.8 °F (37.1 °C) (Infrared)   Resp 14   Ht 165.1 cm (65\")   Wt 90.6 kg (199 lb 12.8 oz)   SpO2 98%   BMI 33.25 kg/m²     Physical Exam  Vitals and nursing note reviewed.   Constitutional:       Appearance: Normal appearance. She is well-developed and well-groomed. She is obese.   HENT:      Head: Normocephalic and atraumatic.   Neck:      Thyroid: No thyroid mass, thyromegaly or thyroid tenderness.      Vascular: No carotid bruit.      Trachea: Trachea and phonation normal. No tracheal tenderness.   Cardiovascular:      Rate and Rhythm: Normal rate and regular rhythm.      Pulses: Normal pulses.      Heart sounds: Normal heart sounds, S1 normal and S2 normal. No murmur heard.  Pulmonary:      Effort: Pulmonary effort is normal.      Breath sounds: Normal breath sounds and air entry.   Abdominal:      General: Bowel sounds are normal.      Palpations: Abdomen is soft. There is no hepatomegaly.      " Tenderness: There is no abdominal tenderness. There is no right CVA tenderness, left CVA tenderness, guarding or rebound. Negative signs include Carrion's sign, Rovsing's sign, McBurney's sign, psoas sign and obturator sign.   Musculoskeletal:      Cervical back: Neck supple.      Right lower leg: No edema.      Left lower leg: No edema.   Skin:     General: Skin is warm and dry.      Capillary Refill: Capillary refill takes less than 2 seconds.   Neurological:      Mental Status: She is alert and oriented to person, place, and time.   Psychiatric:         Attention and Perception: Attention and perception normal.         Mood and Affect: Mood and affect normal.         Speech: Speech normal.         Behavior: Behavior normal. Behavior is cooperative.         Thought Content: Thought content normal.         Cognition and Memory: Cognition and memory normal.         Judgment: Judgment normal.        Result Review :                 Assessment and Plan    Diagnoses and all orders for this visit:    1. Obesity (BMI 30-39.9) (Primary)    Chen was  seen in office today for chronic and improving obesity.  She is doing well with the Wegovy 2.4 mg once weekly.  No refills are needed at this time.  She will continue with her weight management  through [employer].  Stressed the importance of continue to decrease sugar, carbohydrates and fatty foods in diet.  She will continue to increase her exercising as well.  Return to office in July for annual physical exam with fasting lab work.    I spent 15 minutes caring for Chen on this date of service. This time includes time spent by me in the following activities:preparing for the visit, obtaining and/or reviewing a separately obtained history, performing a medically appropriate examination and/or evaluation , counseling and educating the patient/family/caregiver, and documenting information in the medical record  Follow Up   Return in about 7 months (around 7/20/2024),  or labs prior, for Annual.  Patient was given instructions and counseling regarding her condition or for health maintenance advice. Please see specific information pulled into the AVS if appropriate.     KENDELL Brennan [facility]  [address] [place] [address]  Dept: [phone]  Dept Fax: [phone]  Loc: [phone]  Loc Fax: [phone]

## 2024-01-07 DIAGNOSIS — E66.9 OBESITY (BMI 30-39.9): ICD-10-CM

## 2024-01-08 RX ORDER — SEMAGLUTIDE 2.4 MG/.75ML
2.4 INJECTION, SOLUTION SUBCUTANEOUS WEEKLY
Qty: 3 ML | Refills: 2 | Status: SHIPPED | OUTPATIENT
Start: 2024-01-08

## 2024-02-26 RX ORDER — SCOLOPAMINE TRANSDERMAL SYSTEM 1 MG/1
1 PATCH, EXTENDED RELEASE TRANSDERMAL
Qty: 3 PATCH | Refills: 0 | Status: SHIPPED | OUTPATIENT
Start: 2024-02-26

## 2024-03-08 RX ORDER — AMOXICILLIN AND CLAVULANATE POTASSIUM 875; 125 MG/1; MG/1
1 TABLET, FILM COATED ORAL 2 TIMES DAILY
Qty: 20 TABLET | Refills: 0 | Status: SHIPPED | OUTPATIENT
Start: 2024-03-08

## 2024-03-17 DIAGNOSIS — E66.9 OBESITY (BMI 30-39.9): Primary | ICD-10-CM

## 2024-03-17 DIAGNOSIS — Z76.89 ENCOUNTER FOR WEIGHT MANAGEMENT: ICD-10-CM

## 2024-03-17 RX ORDER — SEMAGLUTIDE 1.7 MG/.75ML
1.7 INJECTION, SOLUTION SUBCUTANEOUS WEEKLY
Qty: 3 ML | Refills: 0 | Status: SHIPPED | OUTPATIENT
Start: 2024-03-17

## 2024-04-15 DIAGNOSIS — Z76.89 ENCOUNTER FOR WEIGHT MANAGEMENT: ICD-10-CM

## 2024-04-15 DIAGNOSIS — L29.9 ITCHING OF EAR: Primary | ICD-10-CM

## 2024-04-15 DIAGNOSIS — E66.9 OBESITY (BMI 30-39.9): ICD-10-CM

## 2024-04-15 RX ORDER — SEMAGLUTIDE 2.4 MG/.75ML
2.4 INJECTION, SOLUTION SUBCUTANEOUS WEEKLY
Qty: 3 ML | Refills: 0 | Status: SHIPPED | OUTPATIENT
Start: 2024-04-15

## 2024-04-15 RX ORDER — FLUOCINOLONE ACETONIDE 0.11 MG/ML
OIL AURICULAR (OTIC) 2 TIMES DAILY
Qty: 20 ML | Refills: 0 | Status: SHIPPED | OUTPATIENT
Start: 2024-04-15

## 2024-05-07 DIAGNOSIS — E66.9 OBESITY (BMI 30-39.9): ICD-10-CM

## 2024-05-07 DIAGNOSIS — Z76.89 ENCOUNTER FOR WEIGHT MANAGEMENT: ICD-10-CM

## 2024-05-08 RX ORDER — SEMAGLUTIDE 2.4 MG/.75ML
INJECTION, SOLUTION SUBCUTANEOUS
Qty: 4 ML | Refills: 0 | Status: SHIPPED | OUTPATIENT
Start: 2024-05-08

## 2024-05-15 ENCOUNTER — OFFICE VISIT (OUTPATIENT)
Dept: FAMILY MEDICINE CLINIC | Facility: CLINIC | Age: 49
End: 2024-05-15
Payer: COMMERCIAL

## 2024-05-15 VITALS
BODY MASS INDEX: 30.16 KG/M2 | HEART RATE: 76 BPM | HEIGHT: 65 IN | WEIGHT: 181 LBS | TEMPERATURE: 98.3 F | DIASTOLIC BLOOD PRESSURE: 78 MMHG | SYSTOLIC BLOOD PRESSURE: 102 MMHG | RESPIRATION RATE: 14 BRPM | OXYGEN SATURATION: 97 %

## 2024-05-15 DIAGNOSIS — E66.9 OBESITY (BMI 30-39.9): Primary | ICD-10-CM

## 2024-05-15 DIAGNOSIS — Z76.89 ENCOUNTER FOR WEIGHT MANAGEMENT: ICD-10-CM

## 2024-05-15 PROCEDURE — 99213 OFFICE O/P EST LOW 20 MIN: CPT | Performed by: PHYSICIAN ASSISTANT

## 2024-05-15 NOTE — PROGRESS NOTES
"Chief Complaint  Weight Check    Subjective          Chen Martin presents to [facility] PRIMARY CARE  History of Present Illness  Chen is a 48-year-old female who presents for weight loss management.  She has lost 18 pounds since last office visit.  States overall she has done well with the Wegovy medication.  She does monthly weight management checks with [facility] insurance.  They do lab work as well.  Overall her diet has improved.  She is making healthier choices in diet by eating healthier and decreasing portion sizes.  She is being more active as well. Chen has been doing strength training and exercising regularly.  Bowel movements have been daily without dark black tarry stools.  Denied any current abdominal pain or GI upset with the Wegovy medication.  Sleep has been normal.     Objective   Vital Signs:   /78   Pulse 76   Temp 98.3 °F (36.8 °C)   Resp 14   Ht 165.1 cm (65\")   Wt 82.1 kg (181 lb)   SpO2 97%   BMI 30.12 kg/m²     Physical Exam  Vitals and nursing note reviewed.   Constitutional:       Appearance: Normal appearance. She is well-developed and well-groomed. She is obese.   HENT:      Head: Normocephalic and atraumatic.   Neck:      Thyroid: No thyroid mass, thyromegaly or thyroid tenderness.      Vascular: No carotid bruit.      Trachea: Trachea and phonation normal. No tracheal tenderness.   Cardiovascular:      Rate and Rhythm: Normal rate and regular rhythm.      Pulses: Normal pulses.      Heart sounds: Normal heart sounds, S1 normal and S2 normal. No murmur heard.  Pulmonary:      Effort: Pulmonary effort is normal.      Breath sounds: Normal breath sounds and air entry.   Abdominal:      General: Bowel sounds are normal.      Palpations: Abdomen is soft. There is no hepatomegaly.      Tenderness: There is no abdominal tenderness. There is no right CVA tenderness, left CVA tenderness, guarding or rebound. Negative signs include Carrion's sign, Rovsing's " sign, McBurney's sign, psoas sign and obturator sign.   Musculoskeletal:      Cervical back: Neck supple.      Right lower leg: No edema.      Left lower leg: No edema.   Skin:     General: Skin is warm and dry.      Capillary Refill: Capillary refill takes less than 2 seconds.   Neurological:      Mental Status: She is alert and oriented to person, place, and time.   Psychiatric:         Attention and Perception: Attention and perception normal.         Mood and Affect: Mood and affect normal.         Speech: Speech normal.         Behavior: Behavior normal. Behavior is cooperative.         Thought Content: Thought content normal.         Cognition and Memory: Cognition and memory normal.         Judgment: Judgment normal.        Result Review :                 Assessment and Plan    Diagnoses and all orders for this visit:    1. Obesity (BMI 30-39.9) (Primary)    2. Encounter for weight management    Chen was seen in office today for chronic and improving obesity with weight management.  She is doing well with the Wegovy medication.  No refills are needed at this time.  She will keep her monthly appointments with [facility] for weight management appointments.  She will continue decreasing sugar, carbohydrates and portion sizes.  Increase physical activity as well.  Return to office later this summer for annual physical exam.    I spent 15 minutes caring for Chen on this date of service. This time includes time spent by me in the following activities:preparing for the visit, obtaining and/or reviewing a separately obtained history, performing a medically appropriate examination and/or evaluation , counseling and educating the patient/family/caregiver, and documenting information in the medical record  Follow Up   Return in about 2 months (around 7/29/2024), or weight-.  Patient was given instructions and counseling regarding her condition or for health maintenance advice. Please see specific information pulled into  the AVS if appropriate.     KENDELL Brennan PC [facility]  [address] [address]  Dept: [phone]  Dept Fax: [phone]  Loc: [phone]  Loc Fax: [phone]

## 2024-05-29 RX ORDER — DICLOFENAC SODIUM 75 MG/1
TABLET, DELAYED RELEASE ORAL
Qty: 60 TABLET | Refills: 0 | Status: SHIPPED | OUTPATIENT
Start: 2024-05-29

## 2024-05-29 NOTE — TELEPHONE ENCOUNTER
Rx Refill Note  Requested Prescriptions     Pending Prescriptions Disp Refills    diclofenac (VOLTAREN) 75 MG EC tablet [Pharmacy Med Name: Diclofenac Sodium 75 MG Oral Tablet Delayed Release] 60 tablet 0     Sig: Take 1 tablet by mouth twice daily      Last office visit with prescribing clinician: Visit date not found      Next office visit with prescribing clinician: Visit date not found   Last Filled: 3/16/2024    Moderate left ankle sprain     Chela Leblanc MA  05/29/24, 10:10 EDT    Previous RX pended for your approval, change or denial.     {TIP  Encounters:    {TIP  Please add Last Relevant Lab Date if appropriate:  {TIP  Is Refill Pharmacy correct?:

## 2024-06-04 DIAGNOSIS — E66.9 OBESITY (BMI 30-39.9): ICD-10-CM

## 2024-06-04 DIAGNOSIS — Z76.89 ENCOUNTER FOR WEIGHT MANAGEMENT: ICD-10-CM

## 2024-06-04 RX ORDER — SEMAGLUTIDE 2.4 MG/.75ML
INJECTION, SOLUTION SUBCUTANEOUS
Qty: 4 ML | Refills: 0 | Status: SHIPPED | OUTPATIENT
Start: 2024-06-04

## 2024-06-30 DIAGNOSIS — Z76.89 ENCOUNTER FOR WEIGHT MANAGEMENT: ICD-10-CM

## 2024-06-30 DIAGNOSIS — E66.9 OBESITY (BMI 30-39.9): ICD-10-CM

## 2024-07-01 RX ORDER — SEMAGLUTIDE 2.4 MG/.75ML
INJECTION, SOLUTION SUBCUTANEOUS
Qty: 3 ML | Refills: 0 | Status: SHIPPED | OUTPATIENT
Start: 2024-07-01

## 2024-07-30 NOTE — TELEPHONE ENCOUNTER
Rx Refill Note  Requested Prescriptions     Pending Prescriptions Disp Refills    diclofenac (VOLTAREN) 75 MG EC tablet [Pharmacy Med Name: Diclofenac Sodium 75 MG Oral Tablet Delayed Release] 60 tablet 0     Sig: Take 1 tablet by mouth twice daily      Last office visit with prescribing clinician: Visit date not found      Next office visit with prescribing clinician: Visit date not found   Last Filled: 5//29/2024    Moderate left ankle sprain     Chela Leblanc MA  07/30/24, 10:04 EDT    Previous RX pended for your approval, change or denial.     {TIP  Encounters:    {TIP  Please add Last Relevant Lab Date if appropriate:  {TIP  Is Refill Pharmacy correct?:

## 2024-07-31 RX ORDER — DICLOFENAC SODIUM 75 MG/1
TABLET, DELAYED RELEASE ORAL
Qty: 60 TABLET | Refills: 0 | Status: SHIPPED | OUTPATIENT
Start: 2024-07-31

## 2024-07-31 NOTE — TELEPHONE ENCOUNTER
Rx Refill Note  Requested Prescriptions     Pending Prescriptions Disp Refills    diclofenac (VOLTAREN) 75 MG EC tablet [Pharmacy Med Name: Diclofenac Sodium 75 MG Oral Tablet Delayed Release] 60 tablet 0     Sig: Take 1 tablet by mouth twice daily      Last office visit with prescribing clinician: Visit date not found      Next office visit with prescribing clinician: Visit date not found   Last Filled: 529/2024    Moderate left ankle sprain     Chela Leblanc MA  07/31/24, 08:27 EDT    Previous RX pended for your approval, change or denial.     {TIP  Encounters:    {TIP  Please add Last Relevant Lab Date if appropriate:  {TIP  Is Refill Pharmacy correct?:

## 2024-08-02 DIAGNOSIS — E66.9 OBESITY (BMI 30-39.9): ICD-10-CM

## 2024-08-02 DIAGNOSIS — Z76.89 ENCOUNTER FOR WEIGHT MANAGEMENT: ICD-10-CM

## 2024-08-02 RX ORDER — SEMAGLUTIDE 2.4 MG/.75ML
INJECTION, SOLUTION SUBCUTANEOUS
Qty: 4 ML | Refills: 0 | Status: SHIPPED | OUTPATIENT
Start: 2024-08-02

## 2024-08-30 ENCOUNTER — OFFICE VISIT (OUTPATIENT)
Dept: FAMILY MEDICINE CLINIC | Facility: CLINIC | Age: 49
End: 2024-08-30
Payer: COMMERCIAL

## 2024-08-30 VITALS
OXYGEN SATURATION: 100 % | HEIGHT: 65 IN | BODY MASS INDEX: 29.49 KG/M2 | TEMPERATURE: 98.3 F | HEART RATE: 67 BPM | WEIGHT: 177 LBS | DIASTOLIC BLOOD PRESSURE: 80 MMHG | SYSTOLIC BLOOD PRESSURE: 122 MMHG

## 2024-08-30 DIAGNOSIS — E66.9 OBESITY (BMI 30-39.9): ICD-10-CM

## 2024-08-30 DIAGNOSIS — Z76.89 ENCOUNTER TO ESTABLISH CARE: ICD-10-CM

## 2024-08-30 DIAGNOSIS — E66.3 OVERWEIGHT WITH BODY MASS INDEX (BMI) 25.0-29.9: ICD-10-CM

## 2024-08-30 DIAGNOSIS — L29.9 ITCHING OF EAR: ICD-10-CM

## 2024-08-30 DIAGNOSIS — Z00.00 ANNUAL PHYSICAL EXAM: Primary | ICD-10-CM

## 2024-08-30 DIAGNOSIS — Z76.89 ENCOUNTER FOR WEIGHT MANAGEMENT: ICD-10-CM

## 2024-08-30 PROCEDURE — 99396 PREV VISIT EST AGE 40-64: CPT | Performed by: NURSE PRACTITIONER

## 2024-08-30 RX ORDER — SEMAGLUTIDE 2.4 MG/.75ML
2.4 INJECTION, SOLUTION SUBCUTANEOUS WEEKLY
Qty: 3 ML | Refills: 5 | Status: SHIPPED | OUTPATIENT
Start: 2024-08-30 | End: 2024-08-30 | Stop reason: ALTCHOICE

## 2024-08-30 RX ORDER — FLUOCINOLONE ACETONIDE 0.1 MG/G
CREAM TOPICAL
Qty: 60 G | Refills: 1 | Status: SHIPPED | OUTPATIENT
Start: 2024-08-30

## 2024-08-30 NOTE — PATIENT INSTRUCTIONS
Weight Management:  Increase ounces of water intake to goal of 1/2 body weight per day.  Increase grams of protein intake to 1/2 body weight per day.    Limit sugar and carbohydrate intake.  Incorporate exercise into daily routine with goal of 150 minutes per week.

## 2024-08-30 NOTE — PROGRESS NOTES
Patient ID: Chen Martin is a 48 y.o. female     Patient Care Team:  Head, OBIE Bedoya as PCP - General (Nurse Practitioner)    Subjective     Chief Complaint   Patient presents with    [facility]     Transfer of care     Annual Exam       History of Present Illness    Chen Martin was a patient of Edyta Schmid PA-C who is no longer with our practice.  I will be taking over this patient's primary care.      Last seen Edyta on May 15, 2024 for weight management.  Last physical completed July 10, 2023.    She presents to [facility] today for annual physical exam..     Weight management: Currently on Wegovy 2.4 mg daily.  Started in July 2023.  Weight at that time was 224 pounds.  Feels she has plateaued.  Has not lost weight in the past couple of months.  Wondering if able to switch to Zepbound.  Complains of itching ear canals.  Currently using fluocinolone otic drops which is not working as well.  Requesting cream she used years ago.        Health Habits:  Dental Exam: Up to date  Eye Exam: Up to date  Diet: Portion control  Exercise: 5 times/week.  Current exercise activities include: walking and strength training    Cologuard - , (04/11/2023 07:00)  Mammo Screening Digital Tomosynthesis Bilateral With CAD (08/24/2024 09:10)  Pap: Completed February 28, 2022.    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     She denies any complaints of fever, chills, cough, chest pain, shortness of air, abdominal pain, nausea, or any other concerns.     The following portions of the patient's history were reviewed and updated as appropriate: allergies, current medications, past family history, past medical history, past social history, past surgical history and problem list.       ROS    Vitals:    08/30/24 1435   BP: 122/80   Pulse: 67   Temp: 98.3 °F (36.8 °C)   SpO2: 100%       Documented weights    08/30/24 1435   Weight: 80.3 kg (177 lb)     Body mass index is 29.45  kg/m².    Results for orders placed or performed in visit on 06/26/23   TSH    Specimen: Blood   Result Value Ref Range    TSH 2.420 0.270 - 4.200 uIU/mL       Data reviewed : Labs:  She had blood work completed with [facility] on May 3, 2024.  Glucose was 86.  Kidney and liver function test stable.  Her cholesterol levels have improved with total cholesterol of 189, triglycerides of 63, LDL of 121.  Previous level was 141.  Her HDL is 56.  Hemoglobin A1c was 5.0.  TSH normal at 1.09.  Objective     Physical Exam  Constitutional:       Appearance: She is well-developed.   HENT:      Head: Normocephalic and atraumatic.      Right Ear: Tympanic membrane normal.      Left Ear: Tympanic membrane normal.   Eyes:      Pupils: Pupils are equal, round, and reactive to light.   Cardiovascular:      Rate and Rhythm: Normal rate and regular rhythm.      Heart sounds: Normal heart sounds. No murmur heard.  Pulmonary:      Effort: Pulmonary effort is normal.      Breath sounds: Normal breath sounds. No wheezing, rhonchi or rales.   Abdominal:      Palpations: Abdomen is soft.   Musculoskeletal:         General: No tenderness. Normal range of motion.      Cervical back: Normal range of motion and neck supple.      Right lower leg: No edema.      Left lower leg: No edema.   Skin:     General: Skin is warm and dry.      Findings: No erythema or rash.   Neurological:      Mental Status: She is alert and oriented to person, place, and time.   Psychiatric:         Mood and Affect: Mood normal.         Behavior: Behavior normal.         Assessment & Plan     Assessment/Plan     Diagnoses and all orders for this visit:    1. Annual physical exam (Primary)    2. Encounter to establish care    3. Obesity (BMI 30-39.9)    4. Encounter for weight management  -     Discontinue: Semaglutide-Weight Management (Wegovy) 2.4 MG/0.75ML solution auto-injector; Inject 2.4 mg under the skin into the appropriate area as directed 1 (One) Time Per Week.   Dispense: 3 mL; Refill: 5    5. Overweight with body mass index (BMI) 25.0-29.9  -     Discontinue: Semaglutide-Weight Management (Wegovy) 2.4 MG/0.75ML solution auto-injector; Inject 2.4 mg under the skin into the appropriate area as directed 1 (One) Time Per Week.  Dispense: 3 mL; Refill: 5  -     Tirzepatide-Weight Management (ZEPBOUND) 5 MG/0.5ML solution auto-injector; Inject 0.5 mL under the skin into the appropriate area as directed 1 (One) Time Per Week.  Dispense: 2 mL; Refill: 0    6. Itching of ear  -     Switch to cream instead of drops.  -   fluocinolone 0.01 % cream; Apply twice a day as needed to both ears for itching.  Dispense: 60 g; Refill: 1        Follow Up:  Return in about 6 months (around 2/28/2025) for Recheck on weight - same day fasting labs.  .    In the meantime, instructed to contact us sooner for any problems or concerns.    Patient was given instructions and counseling regarding condition or for health maintenance advice.  Please see specific information pulled into the AVS if appropriate.      Weight Management:  Increase ounces of water intake to goal of 1/2 body weight per day.  Increase grams of protein intake to 1/2 body weight per day.    Limit sugar and carbohydrate intake.  Incorporate exercise into daily routine with goal of 150 minutes per week.        Mercedes Workman, APRN  Family Medicine  [facility] [place]  08/30/24  17:02 EDT

## 2024-09-24 DIAGNOSIS — E66.3 OVERWEIGHT WITH BODY MASS INDEX (BMI) 25.0-29.9: Primary | ICD-10-CM

## 2024-09-25 RX ORDER — DICLOFENAC SODIUM 75 MG/1
TABLET, DELAYED RELEASE ORAL
Qty: 60 TABLET | Refills: 0 | OUTPATIENT
Start: 2024-09-25

## 2024-10-08 RX ORDER — DICLOFENAC SODIUM 75 MG/1
75 TABLET, DELAYED RELEASE ORAL 2 TIMES DAILY
Qty: 60 TABLET | Refills: 5 | Status: SHIPPED | OUTPATIENT
Start: 2024-10-08

## 2024-10-23 DIAGNOSIS — E66.3 OVERWEIGHT WITH BODY MASS INDEX (BMI) 25.0-29.9: ICD-10-CM

## 2024-10-24 RX ORDER — TIRZEPATIDE 7.5 MG/.5ML
INJECTION, SOLUTION SUBCUTANEOUS
Qty: 2 ML | Refills: 0 | Status: SHIPPED | OUTPATIENT
Start: 2024-10-24

## 2024-11-26 ENCOUNTER — OFFICE VISIT (OUTPATIENT)
Dept: FAMILY MEDICINE CLINIC | Facility: CLINIC | Age: 49
End: 2024-11-26
Payer: COMMERCIAL

## 2024-11-26 VITALS
HEART RATE: 96 BPM | WEIGHT: 171 LBS | SYSTOLIC BLOOD PRESSURE: 122 MMHG | HEIGHT: 65 IN | OXYGEN SATURATION: 100 % | BODY MASS INDEX: 28.49 KG/M2 | DIASTOLIC BLOOD PRESSURE: 80 MMHG | TEMPERATURE: 98 F

## 2024-11-26 DIAGNOSIS — Z76.89 ENCOUNTER FOR WEIGHT MANAGEMENT: ICD-10-CM

## 2024-11-26 DIAGNOSIS — E66.3 OVERWEIGHT WITH BODY MASS INDEX (BMI) OF 28 TO 28.9 IN ADULT: Primary | ICD-10-CM

## 2024-11-26 DIAGNOSIS — E78.00 HYPERCHOLESTEREMIA: ICD-10-CM

## 2024-11-26 PROCEDURE — 99213 OFFICE O/P EST LOW 20 MIN: CPT | Performed by: NURSE PRACTITIONER

## 2024-11-26 NOTE — PROGRESS NOTES
Answers submitted by the patient for this visit:  Primary Reason for Visit (Submitted on 11/24/2024)  What is the primary reason for your visit?: Problem Not Listed  Problem not listed (Submitted on 11/24/2024)  Chief Complaint: Other medical problem  abdominal pain: No  anorexia: No  joint pain: No  change in stool: No  chest pain: No  chills: No  nasal congestion: No  cough: No  diaphoresis: No  fatigue: No  fever: No  headaches: No  joint swelling: No  myalgias: No  nausea: No  neck pain: No  numbness: No  rash: No  sore throat: No  swollen glands: No  dysuria: No  vertigo: No  visual change: No  vomiting: No  weakness: No  Other symptom: Weight check and bloodwork  Onset: at an unknown time  Chronicity: recurrent  Frequency: monthly    Patient ID: Chen Martin is a 49 y.o. female     Patient Care Team:  Head, OBIE Bedoya as PCP - General (Nurse Practitioner)    Subjective     Chief Complaint   Patient presents with    Weight Check       History of Present Illness    Chen Martin presents to [facility] today for continued weight management.     History of Present Illness  The patient presents for a follow-up after being on Zepbound 7.5 mg weekly.    She reports a positive response to the Zepbound medication, with no side effects experienced. She has been on this dose since 10/24/2024 and is considering an increase in dosage. She has two vials of the medication stored in her refrigerator. Weight was 199 on 12/20/23.      She has been fasting today and has completed a weight management program. Her weight goal is 150 pounds, which she believes is achievable as she weighed 145 pounds after her daughter's birth. She is practicing portion control in her diet, engages in evening walks, and is making efforts to increase her water intake.    She reports no gastrointestinal issues or nausea. Her bowel movements have improved compared to when she was on Wegovy. She has a history of  gallstones and has undergone gallbladder removal.     She reports no pain and mentions that her knee pain has lessened with weight loss.    Results         She denies any complaints of fever, chills, cough, chest pain, shortness of air, abdominal pain, nausea, or any other concerns.     The following portions of the patient's history were reviewed and updated as appropriate: allergies, current medications, past family history, past medical history, past social history, past surgical history and problem list.       ROS    Vitals:    11/26/24 0811   BP: 122/80   Pulse: 96   Temp: 98 °F (36.7 °C)   SpO2: 100%       Documented weights    11/26/24 0811   Weight: 77.6 kg (171 lb)     Body mass index is 28.46 kg/m².    Results for orders placed or performed in visit on 06/26/23   TSH    Collection Time: 07/03/23  8:17 AM    Specimen: Blood   Result Value Ref Range    TSH 2.420 0.270 - 4.200 uIU/mL           Objective     Physical Exam  Vitals reviewed.   Constitutional:       General: She is not in acute distress.  Cardiovascular:      Rate and Rhythm: Normal rate and regular rhythm.      Heart sounds: No murmur heard.  Pulmonary:      Effort: Pulmonary effort is normal.      Breath sounds: Normal breath sounds. No wheezing or rhonchi.   Abdominal:      Palpations: Abdomen is soft.   Musculoskeletal:      Right lower leg: No edema.      Left lower leg: No edema.   Neurological:      Mental Status: She is alert and oriented to person, place, and time.   Psychiatric:         Mood and Affect: Mood normal.         Behavior: Behavior normal.         Physical Exam  Vital Signs  Weight is 171. BMI is 28.           Assessment & Plan     Assessment/Plan     Diagnoses and all orders for this visit:    1. Overweight with body mass index (BMI) of 28 to 28.9 in adult (Primary)  -     Tirzepatide-Weight Management (ZEPBOUND) 10 MG/0.5ML solution auto-injector; Inject 0.5 mL under the skin into the appropriate area as directed 1 (One)  Time Per Week.  Dispense: 2 mL; Refill: 0    2. Hypercholesteremia  -     Comprehensive Metabolic Panel  -     Lipid Panel With / Chol / HDL Ratio  -     TSH Rfx On Abnormal To Free T4  -     CBC (No Diff)  -     Tirzepatide-Weight Management (ZEPBOUND) 10 MG/0.5ML solution auto-injector; Inject 0.5 mL under the skin into the appropriate area as directed 1 (One) Time Per Week.  Dispense: 2 mL; Refill: 0    3. Encounter for weight management        Assessment & Plan  1. Weight management.  Her cholesterol levels were elevated, but her CBC results were satisfactory. Her BMI has decreased from 30-39 to 28. She has been on Zepbound 7.5 mg weekly since October 24, 2024, and is tolerating it well without gastrointestinal side effects. A prescription for Zepbound 10 mg will be sent. She is advised to maintain a daily water intake of 64 to 70 ounces. She is also advised to continue portion control and evening walks for exercise.          Follow Up:  Return in about 9 months (around 9/1/2025) for Annual physical with fasting labs week before. .    In the meantime, instructed to contact us sooner for any problems or concerns.    Patient was given instructions and counseling regarding condition or for health maintenance advice.  Please see specific information pulled into the AVS if appropriate.      Patient or patient representative verbalized consent for the use of Ambient Listening during the visit with  OBIE Andersen for chart documentation. 11/26/2024  09:20 OBIE Zendejas  Family Medicine  [facility]  11/26/24  09:18 EST

## 2024-12-03 DIAGNOSIS — R42 DIZZINESS: Primary | ICD-10-CM

## 2024-12-03 RX ORDER — MECLIZINE HYDROCHLORIDE 25 MG/1
25 TABLET ORAL 3 TIMES DAILY PRN
Qty: 30 TABLET | Refills: 0 | Status: SHIPPED | OUTPATIENT
Start: 2024-12-03

## 2024-12-23 DIAGNOSIS — E78.00 HYPERCHOLESTEREMIA: ICD-10-CM

## 2024-12-23 DIAGNOSIS — E66.3 OVERWEIGHT WITH BODY MASS INDEX (BMI) OF 28 TO 28.9 IN ADULT: ICD-10-CM

## 2024-12-26 LAB
ALBUMIN SERPL-MCNC: 4.3 G/DL (ref 3.5–5.2)
ALBUMIN/GLOB SERPL: 1.8 G/DL
ALP SERPL-CCNC: 67 U/L (ref 39–117)
ALT SERPL-CCNC: 31 U/L (ref 1–33)
AST SERPL-CCNC: 21 U/L (ref 1–32)
BILIRUB SERPL-MCNC: 0.7 MG/DL (ref 0–1.2)
BUN SERPL-MCNC: 8 MG/DL (ref 6–20)
BUN/CREAT SERPL: 11 (ref 7–25)
CALCIUM SERPL-MCNC: 9.2 MG/DL (ref 8.6–10.5)
CHLORIDE SERPL-SCNC: 105 MMOL/L (ref 98–107)
CHOLEST SERPL-MCNC: 198 MG/DL (ref 0–200)
CHOLEST/HDLC SERPL: 3.81 {RATIO}
CO2 SERPL-SCNC: 24.4 MMOL/L (ref 22–29)
CREAT SERPL-MCNC: 0.73 MG/DL (ref 0.57–1)
EGFRCR SERPLBLD CKD-EPI 2021: 101.6 ML/MIN/1.73
ERYTHROCYTE [DISTWIDTH] IN BLOOD BY AUTOMATED COUNT: 11.4 % (ref 12.3–15.4)
GLOBULIN SER CALC-MCNC: 2.4 GM/DL
GLUCOSE SERPL-MCNC: 88 MG/DL (ref 65–99)
HCT VFR BLD AUTO: 38.5 % (ref 34–46.6)
HDLC SERPL-MCNC: 52 MG/DL (ref 40–60)
HGB BLD-MCNC: 13.1 G/DL (ref 12–15.9)
LDLC SERPL CALC-MCNC: 134 MG/DL (ref 0–100)
MCH RBC QN AUTO: 33.6 PG (ref 26.6–33)
MCHC RBC AUTO-ENTMCNC: 34 G/DL (ref 31.5–35.7)
MCV RBC AUTO: 98.7 FL (ref 79–97)
PLATELET # BLD AUTO: 336 10*3/MM3 (ref 140–450)
POTASSIUM SERPL-SCNC: 4.3 MMOL/L (ref 3.5–5.2)
PROT SERPL-MCNC: 6.7 G/DL (ref 6–8.5)
RBC # BLD AUTO: 3.9 10*6/MM3 (ref 3.77–5.28)
SODIUM SERPL-SCNC: 139 MMOL/L (ref 136–145)
TRIGL SERPL-MCNC: 64 MG/DL (ref 0–150)
TSH SERPL DL<=0.005 MIU/L-ACNC: 1.7 UIU/ML (ref 0.27–4.2)
VLDLC SERPL CALC-MCNC: 12 MG/DL (ref 5–40)
WBC # BLD AUTO: 6.3 10*3/MM3 (ref 3.4–10.8)

## 2025-01-17 DIAGNOSIS — E66.3 OVERWEIGHT WITH BODY MASS INDEX (BMI) OF 28 TO 28.9 IN ADULT: ICD-10-CM

## 2025-01-17 DIAGNOSIS — E78.00 HYPERCHOLESTEREMIA: ICD-10-CM

## 2025-01-17 RX ORDER — TIRZEPATIDE 10 MG/.5ML
INJECTION, SOLUTION SUBCUTANEOUS
Qty: 4 ML | Refills: 0 | Status: SHIPPED | OUTPATIENT
Start: 2025-01-17

## 2025-02-14 DIAGNOSIS — E66.3 OVERWEIGHT WITH BODY MASS INDEX (BMI) OF 28 TO 28.9 IN ADULT: ICD-10-CM

## 2025-02-14 DIAGNOSIS — E78.00 HYPERCHOLESTEREMIA: ICD-10-CM

## 2025-02-14 RX ORDER — TIRZEPATIDE 10 MG/.5ML
INJECTION, SOLUTION SUBCUTANEOUS
Qty: 4 ML | Refills: 0 | Status: SHIPPED | OUTPATIENT
Start: 2025-02-14

## 2025-02-20 ENCOUNTER — OFFICE VISIT (OUTPATIENT)
Dept: ORTHOPEDIC SURGERY | Facility: CLINIC | Age: 50
End: 2025-02-20
Payer: COMMERCIAL

## 2025-02-20 VITALS — BODY MASS INDEX: 28.49 KG/M2 | WEIGHT: 171 LBS | HEIGHT: 65 IN

## 2025-02-20 DIAGNOSIS — M77.11 LATERAL EPICONDYLITIS OF RIGHT ELBOW: Primary | ICD-10-CM

## 2025-02-20 RX ORDER — LIDOCAINE HYDROCHLORIDE 10 MG/ML
2 INJECTION, SOLUTION EPIDURAL; INFILTRATION; INTRACAUDAL; PERINEURAL
Status: COMPLETED | OUTPATIENT
Start: 2025-02-20 | End: 2025-02-20

## 2025-02-20 RX ORDER — TRIAMCINOLONE ACETONIDE 40 MG/ML
40 INJECTION, SUSPENSION INTRA-ARTICULAR; INTRAMUSCULAR
Status: COMPLETED | OUTPATIENT
Start: 2025-02-20 | End: 2025-02-20

## 2025-02-20 RX ADMIN — LIDOCAINE HYDROCHLORIDE 2 ML: 10 INJECTION, SOLUTION EPIDURAL; INFILTRATION; INTRACAUDAL; PERINEURAL at 13:56

## 2025-02-20 RX ADMIN — TRIAMCINOLONE ACETONIDE 40 MG: 40 INJECTION, SUSPENSION INTRA-ARTICULAR; INTRAMUSCULAR at 13:56

## 2025-02-20 NOTE — PROGRESS NOTES
Subjective:     Patient ID: Chen Martin is a 49 y.o. female.    Chief Complaint:  Follow-up lateral epicondylitis right elbow  Follow-up DJD bilateral knees  History of Present Illness  History of Present Illness  The patient is a 49-year-old female who presents to the clinic today for evaluation of her right upper extremity and bilateral knees.    She has been experiencing intermittent pain in her right upper extremity for the past few weeks, which worsened during the lifting of heavy buckets of water. Her daily activities, particularly lifting, pulling, and pushing, seem to exacerbate these symptoms. She reports no numbness or tingling in the right upper extremity and has no other concerns at present.    She reports pain across the anterior aspect of both knees. She has lost approximately 85 pounds, which has significantly helped with symptom improvement. Her bilateral knees are doing quite well on today's visit. She is currently taking diclofenac twice daily, which seems to keep her symptoms under control.    MEDICATIONS  Diclofenac.       Social History     Occupational History    Not on file   Tobacco Use    Smoking status: Never    Smokeless tobacco: Never   Vaping Use    Vaping status: Never Used   Substance and Sexual Activity    Alcohol use: Not Currently     Comment: SOCIALLY    Drug use: No    Sexual activity: Yes     Partners: Male     Birth control/protection: I.U.D.      Past Medical History:   Diagnosis Date    Allergic     Left ear    Migraine      Past Surgical History:   Procedure Laterality Date     SECTION      LAPAROSCOPIC CHOLECYSTECTOMY  2019    [facility]    SINUS SURGERY         Family History   Problem Relation Age of Onset    Osteoporosis Mother     Stroke Father     Diabetes Brother     Heart disease Maternal Grandmother     Hypertension Maternal Grandmother     Glaucoma Maternal Grandmother                Objective:  Physical Exam    General: No acute distress.  Eyes:  "conjunctiva clear; pupils equally round and reactive  ENT: external ears and nose atraumatic; oropharynx clear  CV: no peripheral edema  Resp: normal respiratory effort  Skin: no rashes or wounds; normal turgor  Psych: mood and affect appropriate; recent and remote memory intact    Vitals:    02/20/25 1336   Weight: 77.6 kg (171 lb)   Height: 165.1 cm (65\")         02/20/25  1336   Weight: 77.6 kg (171 lb)     Body mass index is 28.46 kg/m².      Right Elbow Exam     Tenderness   The patient is experiencing tenderness in the lateral epicondyle.     Range of Motion   Extension:  0   Flexion:  130   Pronation:  0   Supination:  130     Muscle Strength   Pronation:  4/5   Supination:  4/5     Tests   Varus: negative  Valgus: negative  Tinel's sign (cubital tunnel): negative    Other   Erythema: absent  Sensation: normal  Pulse: present             No examination of knees today as knees are improving  Physical Exam      Assessment:        1. Lateral epicondylitis of right elbow         Assessment & Plan  1. Pain in the right upper extremity.  She reports experiencing pain along the lateral epicondyle of the right upper extremity, exacerbated by lifting, pulling, and pushing activities. There is no numbness or tingling reported. A long discussion was held regarding treatment options, including proceeding with a corticosteroid injection along the lateral epicondyle. It was explained that significant symptom improvement might take 3 to 7 days post-injection. She is encouraged to call with any questions or concerns. If there is no improvement, ice injections are recommended.    2. Bilateral knee pain.  She reports that her bilateral knee pain has significantly improved after losing approximately 85 pounds. She is currently taking diclofenac twice daily, which keeps her symptoms under control.    Plan:    Medium Joint Arthrocentesis: R elbow  Date/Time: 2/20/2025 1:56 PM  Consent given by: patient  Site marked: site " marked  Timeout: Immediately prior to procedure a time out was called to verify the correct patient, procedure, equipment, support staff and site/side marked as required   Supporting Documentation  Indications: pain   Procedure Details  Location: elbow - R elbow  Preparation: Patient was prepped and draped in the usual sterile fashion  Needle size: 22 G  Approach: Lateral Epicondyle.  Medications administered: 2 mL lidocaine PF 1% 1 %; 40 mg triamcinolone acetonide 40 MG/ML  Patient tolerance: patient tolerated the procedure well with no immediate complications        Orders:  Orders Placed This Encounter   Procedures    Medium Joint Arthrocentesis: R elbow     No orders of the defined types were placed in this encounter.          Dragon dictation utilized          Patient or patient representative verbalized consent for the use of Ambient Listening during the visit with  OBIE Abraham for chart documentation. 2/20/2025  14:18 EST

## 2025-03-11 DIAGNOSIS — E66.3 OVERWEIGHT WITH BODY MASS INDEX (BMI) OF 28 TO 28.9 IN ADULT: Primary | ICD-10-CM

## 2025-03-26 DIAGNOSIS — E66.3 OVERWEIGHT WITH BODY MASS INDEX (BMI) OF 28 TO 28.9 IN ADULT: Primary | ICD-10-CM

## 2025-04-19 DIAGNOSIS — E66.3 OVERWEIGHT WITH BODY MASS INDEX (BMI) OF 28 TO 28.9 IN ADULT: ICD-10-CM

## 2025-04-21 RX ORDER — TIRZEPATIDE 7.5 MG/.5ML
7.5 INJECTION, SOLUTION SUBCUTANEOUS WEEKLY
Qty: 2 ML | Refills: 0 | Status: SHIPPED | OUTPATIENT
Start: 2025-04-21

## 2025-05-13 DIAGNOSIS — E66.3 OVERWEIGHT WITH BODY MASS INDEX (BMI) OF 28 TO 28.9 IN ADULT: ICD-10-CM

## 2025-05-13 RX ORDER — TIRZEPATIDE 7.5 MG/.5ML
7.5 INJECTION, SOLUTION SUBCUTANEOUS WEEKLY
Qty: 2 ML | Refills: 0 | Status: SHIPPED | OUTPATIENT
Start: 2025-05-13

## 2025-06-04 DIAGNOSIS — E66.3 OVERWEIGHT WITH BODY MASS INDEX (BMI) OF 28 TO 28.9 IN ADULT: ICD-10-CM

## 2025-06-04 RX ORDER — TIRZEPATIDE 7.5 MG/.5ML
7.5 INJECTION, SOLUTION SUBCUTANEOUS WEEKLY
Qty: 2 ML | Refills: 0 | Status: CANCELLED | OUTPATIENT
Start: 2025-06-04

## 2025-06-05 DIAGNOSIS — E66.3 OVERWEIGHT WITH BODY MASS INDEX (BMI) OF 28 TO 28.9 IN ADULT: ICD-10-CM

## 2025-06-05 RX ORDER — TIRZEPATIDE 7.5 MG/.5ML
7.5 INJECTION, SOLUTION SUBCUTANEOUS WEEKLY
Qty: 6 ML | Refills: 0 | Status: SHIPPED | OUTPATIENT
Start: 2025-06-05 | End: 2025-06-08 | Stop reason: SDUPTHER

## 2025-06-08 DIAGNOSIS — E66.3 OVERWEIGHT WITH BODY MASS INDEX (BMI) OF 28 TO 28.9 IN ADULT: ICD-10-CM

## 2025-06-09 RX ORDER — TIRZEPATIDE 7.5 MG/.5ML
7.5 INJECTION, SOLUTION SUBCUTANEOUS WEEKLY
Qty: 2 ML | Refills: 0 | Status: SHIPPED | OUTPATIENT
Start: 2025-06-09

## 2025-07-07 DIAGNOSIS — E78.00 HYPERCHOLESTEREMIA: ICD-10-CM

## 2025-07-07 DIAGNOSIS — Z00.00 ANNUAL PHYSICAL EXAM: Primary | ICD-10-CM

## 2025-07-28 ENCOUNTER — OFFICE VISIT (OUTPATIENT)
Dept: FAMILY MEDICINE CLINIC | Facility: CLINIC | Age: 50
End: 2025-07-28
Payer: COMMERCIAL

## 2025-07-28 VITALS
HEIGHT: 65 IN | WEIGHT: 161.1 LBS | HEART RATE: 70 BPM | TEMPERATURE: 98.2 F | OXYGEN SATURATION: 100 % | DIASTOLIC BLOOD PRESSURE: 76 MMHG | BODY MASS INDEX: 26.84 KG/M2 | SYSTOLIC BLOOD PRESSURE: 120 MMHG

## 2025-07-28 DIAGNOSIS — E66.9 OBESITY (BMI 30-39.9): ICD-10-CM

## 2025-07-28 DIAGNOSIS — E78.00 HYPERCHOLESTEREMIA: ICD-10-CM

## 2025-07-28 DIAGNOSIS — Z00.00 ANNUAL PHYSICAL EXAM: Primary | ICD-10-CM

## 2025-07-28 DIAGNOSIS — N95.1 PERIMENOPAUSAL SYMPTOMS: ICD-10-CM

## 2025-07-28 PROCEDURE — 99396 PREV VISIT EST AGE 40-64: CPT | Performed by: NURSE PRACTITIONER

## 2025-07-28 PROCEDURE — 99213 OFFICE O/P EST LOW 20 MIN: CPT | Performed by: NURSE PRACTITIONER

## 2025-07-28 RX ORDER — TIRZEPATIDE 7.5 MG/.5ML
7.5 INJECTION, SOLUTION SUBCUTANEOUS WEEKLY
Qty: 6 ML | Refills: 1 | Status: SHIPPED | OUTPATIENT
Start: 2025-07-28

## 2025-07-28 NOTE — PROGRESS NOTES
Chief Complaint   Patient presents with    Annual Exam    Weight Loss       Patient Care Team:  Head, OBIE Bedoya as PCP - General (Nurse Practitioner)    Subjective   Chen Martin is a 49 y.o. female and is here for a yearly physical exam. The patient reports no problems.    History of Present Illness  The patient presents for a yearly checkup and to discuss weight management.    She has completed her course of tirzepatide 7.5 mg, which was effective in managing her weight. However, due to insurance issues, she is unable to continue with this medication. She had previously tried Wegovy but discontinued it due to side effects, including diarrhea. She also experienced side effects with Saxenda. She reports a total weight loss of 90 pounds, which has significantly improved her knee pain and overall well being.  Also has lowered her cholesterol levels.  Cholesterol levels have also improved.    She initially lost 22 pounds on her own with diet and exercise prior to the use of medications.      She had a Pap smear today with Dr. Cruz. She is due for a mammogram in 08/2025. She has an IUD in place. She visits the dentist twice a year and her eye exams are up to date.      PAST SURGICAL HISTORY:  Colonoscopy in 2023    Results  Labs   - Liver function test: Liver levels slightly elevated. AST is normal. Bilirubin and alk phosphate are normal.   - Fasting glucose: 80   - Kidney function test: Normal   - Complete Blood Count (CBC): White count is normal. Red blood count is slightly down.   - Cholesterol levels: Improved   - Thyroid function test: Normal   - FSH: 07/2024, Low    Diagnostic Testing   - EKG: Normal    Health Habits:  Dental Exam: Up to date  Eye Exam: Up to date  Diet: Portion control  Exercise: Some.    Current exercise activities include: walking and strength training     Cologuard - , (04/11/2023 07:00)  Mammo Screening Digital Tomosynthesis Bilateral With CAD (08/24/2024 09:10)  Pap: Completed today.   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    The following portions of the patient's history were reviewed and updated as appropriate: allergies, current medications, past family history, past medical history, past social history, past surgical history, and problem list.    Social and Family and Surgical History reviewed and updated today, see Rooming tab.    Health History, Preventive Measures and Vaccination flow sheets reviewed and updated today.    Patient's current medical chart in Epic; including previous office notes, imaging, labs, specialist's evaluation either in notes or in Media tab reviewed today.    Other pertinent medical information also reviewed thru Care Everywhere function is also reviewed today.    Review of Systems  Review of Systems  Vitals:    07/28/25 1529   BP: 120/76   BP Location: Left arm   Patient Position: Sitting   Cuff Size: Adult   Pulse: 70   Temp: 98.2 °F (36.8 °C)   SpO2: 100%   Weight: 73.1 kg (161 lb 1.6 oz)   Height: 165.1 cm (65\")     Wt Readings from Last 3 Encounters:   07/28/25 73.1 kg (161 lb 1.6 oz)   02/20/25 77.6 kg (171 lb)   11/26/24 77.6 kg (171 lb)       Physical Exam  Vitals reviewed.   Constitutional:       General: She is not in acute distress.     Appearance: She is well-developed.   HENT:      Head: Normocephalic and atraumatic.      Right Ear: Tympanic membrane normal.      Left Ear: Tympanic membrane normal.      Nose: No congestion.   Eyes:      Extraocular Movements: Extraocular movements intact.      Pupils: Pupils are equal, round, and reactive to light.   Cardiovascular:      Rate and Rhythm: Normal rate and regular rhythm.      Heart sounds: Normal heart sounds. No murmur heard.  Pulmonary:      Effort: Pulmonary effort is normal.      Breath sounds: Normal breath sounds. No wheezing, rhonchi or rales.   Abdominal:      General: Bowel sounds are normal.      Palpations: Abdomen is soft.      Tenderness: There is no abdominal " tenderness.   Musculoskeletal:         General: No tenderness. Normal range of motion.      Cervical back: Normal range of motion and neck supple.      Right lower leg: No edema.      Left lower leg: No edema.   Skin:     General: Skin is warm and dry.      Findings: No erythema or rash.   Neurological:      Mental Status: She is alert and oriented to person, place, and time.   Psychiatric:         Behavior: Behavior normal.           The 10-year ASCVD risk score (Zuleika STRATTON, et al., 2019) is: 0.8%    Values used to calculate the score:      Age: 49 years      Sex: Female      Is Non- : No      Diabetic: No      Tobacco smoker: No      Systolic Blood Pressure: 120 mmHg      Is BP treated: No      HDL Cholesterol: 51 mg/dL      Total Cholesterol: 172 mg/dL     Results for orders placed or performed in visit on 07/15/25   CBC (No Diff)    Collection Time: 07/15/25  8:47 AM    Specimen: Blood   Result Value Ref Range    WBC 6.01 3.40 - 10.80 10*3/mm3    RBC 3.60 (L) 3.77 - 5.28 10*6/mm3    Hemoglobin 12.2 12.0 - 15.9 g/dL    Hematocrit 36.1 34.0 - 46.6 %    .3 (H) 79.0 - 97.0 fL    MCH 33.9 (H) 26.6 - 33.0 pg    MCHC 33.8 31.5 - 35.7 g/dL    RDW 11.5 (L) 12.3 - 15.4 %    Platelets 280 140 - 450 10*3/mm3   Lipid Panel With / Chol / HDL Ratio    Collection Time: 07/15/25  8:47 AM    Specimen: Blood   Result Value Ref Range    Total Cholesterol 172 0 - 200 mg/dL    Triglycerides 74 0 - 150 mg/dL    HDL Cholesterol 51 40 - 60 mg/dL    VLDL Cholesterol Andrez 14 5 - 40 mg/dL    LDL Chol Calc (NIH) 107 (H) 0 - 100 mg/dL    Chol/HDL Ratio 3.37    Comprehensive Metabolic Panel    Collection Time: 07/15/25  8:47 AM    Specimen: Blood   Result Value Ref Range    Glucose 80 65 - 99 mg/dL    BUN 11.0 6.0 - 20.0 mg/dL    Creatinine 0.76 0.57 - 1.00 mg/dL    EGFR Result 96.8 >60.0 mL/min/1.73    BUN/Creatinine Ratio 14.5 7.0 - 25.0    Sodium 141 136 - 145 mmol/L    Potassium 4.3 3.5 - 5.2 mmol/L     Chloride 107 98 - 107 mmol/L    Total CO2 24.9 22.0 - 29.0 mmol/L    Calcium 9.1 8.6 - 10.5 mg/dL    Total Protein 6.2 6.0 - 8.5 g/dL    Albumin 3.9 3.5 - 5.2 g/dL    Globulin 2.3 gm/dL    A/G Ratio 1.7 g/dL    Total Bilirubin 0.3 0.0 - 1.2 mg/dL    Alkaline Phosphatase 60 39 - 117 U/L    AST (SGOT) 32 1 - 32 U/L    ALT (SGPT) 53 (H) 1 - 33 U/L   TSH Rfx On Abnormal To Free T4    Collection Time: 07/15/25  8:47 AM    Specimen: Blood   Result Value Ref Range    TSH 1.950 0.270 - 4.200 uIU/mL   Unable To Void    Collection Time: 07/15/25  8:47 AM   Result Value Ref Range    Unable to Void Comment      Assessment & Plan     Assessment & Plan  1. Weight management.  - She has lost approximately 10 pounds since 11/2024. Her BMI is currently low, making her ineligible for phentermine.  - Regular exercise, such as walking for 30 minutes five days a week, was recommended. She was encouraged to consume at least 70 ounces of water and 70 grams of protein daily.  - A request will be submitted to her insurance company for the continuation of Zepbound.    - She was advised to maintain a balanced diet, focusing on portion control and limiting sugar and carbohydrate intake.    2. Elevated liver enzymes.  - Her liver enzymes are slightly elevated but not alarming. The elevation could be due to various factors, including ibuprofen or alcohol consumption.  - AST is normal, and other liver function tests, including bilirubin and alkaline phosphatase, are normal.  - No screening parameters needed for injectables.    3. Health maintenance.  - She had a Pap smear today with Dr. Cruz and is due for a mammogram in 08/2025.  - Her colonoscopy was performed in 2023. She visits the dentist twice a year, and her eye exams are up to date.    Follow-up: The patient will follow up in 1 year or sooner if any problems arise.    Diagnoses and all orders for this visit:    1. Annual physical exam (Primary)  -     CBC (No Diff); Future  -      Comprehensive Metabolic Panel; Future  -     Lipid Panel With / Chol / HDL Ratio; Future  -     TSH Rfx On Abnormal To Free T4; Future    2. Obesity (BMI 30-39.9)  -     Tirzepatide-Weight Management (Zepbound) 7.5 MG/0.5ML solution auto-injector; Inject 0.5 mL under the skin into the appropriate area as directed 1 (One) Time Per Week.  Dispense: 6 mL; Refill: 1    3. Hypercholesteremia  -     Lipid Panel With / Chol / HDL Ratio; Future    4. Perimenopausal symptoms  -     FSH & LH; Future  -     Estrogens, Total; Future        1. Patient Counseling:  --Nutrition: Stressed importance of moderation in sodium/caffeine intake, saturated fat and cholesterol.  Discussed caloric balance, sufficient intake of fresh fruits, vegetables, fiber,    calcium, iron.  --Exercise: Stressed the importance of regular exercise.   --Substance Abuse: Discussed cessation/primary prevention of tobacco, alcohol, or other drug use; driving or other dangerous activities under the influence.    --Dental health: Discussed importance of regular tooth brushing, flossing, and dental visits.  --Suggested having eyes and vision checked if needed or past due.  --Immunizations reviewed.  --Discussed benefits of screening colonoscopy.  2. Discussed the patient's BMI with her.  The BMI is in the acceptable range  3. Follow up next physical in 1 year    Patient was given instructions and counseling regarding condition or for health maintenance advice.  Please see specific information pulled into the AVS if appropriate.      Medications Discontinued During This Encounter   Medication Reason    Tirzepatide-Weight Management (Zepbound) 7.5 MG/0.5ML solution auto-injector Reorder          Patient or patient representative verbalized consent for the use of Ambient Listening during the visit with  OBIE Andersen for chart documentation. 7/28/2025  16:56 EDT    OBIE Gaona  [facility]

## 2025-07-28 NOTE — Clinical Note
Sent via  Regular Mail            July 28, 2025    Chen Martin  [address] [address]        Dear Ms. Martin,       Thank you for choosing [facility]. This letter is in regard to your recent visit to our office. Your provider recommended {INCOMPLETEORDER:50645}. So far, we have been unsuccessful in connecting with you to schedule. It is important for you to understand that {INCOMPLETEORDER:11658} is ordered to assure we are providing the best care possible. In some cases, we are trying to detect problems that could potentially have serious health consequences. Please contact us at [phone] and one of our representatives will assist you in scheduling your recommended appointment.      If you have completed the recommendation elsewhere please contact us as soon as possible so we can obtain the results and update your medical records.     We value you as a patient and thank you for allowing [facility] to provide for all of your healthcare needs.       Sincerely,        [facility]

## 2025-07-28 NOTE — LETTER
July 28, 2025     Patient: Chen Martin   YOB: 1975   Date of Visit: 7/28/2025       To Whom It May Concern:    I am writing to provide additional information to support my claim for Chen Martin's treatment of morbid obesity with BMI of 37.28 (Z68.37) with at least one weight related comorbidity which consists of hyperlipidemia (E66.0).  In brief, continued treatment with Zepbound 7.5 mg weekly is medically appropriate and necessary for this patient.  This letter includes the patient's medical history, previous treatments, and disease severity that support my recommendations for treatment with Zepbound.    Obesity:  She was previously prescribed Saxanda back in July 2023. Starting weight was 224 pounds. She had GI side effects of diarrhea. Was eventually switched to Wegovy due to supply issues in October 2023.  She continued to have GI issues with Wegovy however continued to manage symptoms and stay on medication.  She was successful with weight loss along with following along with health . Diet consisted of portion control.  Exercise consisted of walking.  Chen Martin was eventually switched to Zepbound due to continued GI issues of diarrhea and nausea and had plateau with weight loss.  She has lost additional 15 pounds since switching to Zepbound.  Her knee pain has improved with weight loss.  Her cholesterol levels are now within normal range due to weight loss.      Chen Martin has done well with Zepbound which was initially started 8/2024.  She has lost a total of 90 pounds with a starting weight of 245 pounds.  Currently maintaining weight loss on low dose of Zepbound 7.5 mg weekly.  It is my professional opinion, Chen Martin prognosis and disease progression will worsen without treatment with Zepbound.      Please feel free to contact me, Mercedes Workman, OBIE, at [phone] for any additional information you may require.  We look forward to receiving your timely response and  approval of this claim.      Sincerely,    Mercedes Workman, APRN    CC: No Recipients